# Patient Record
Sex: FEMALE | Race: BLACK OR AFRICAN AMERICAN | NOT HISPANIC OR LATINO | Employment: FULL TIME | URBAN - METROPOLITAN AREA
[De-identification: names, ages, dates, MRNs, and addresses within clinical notes are randomized per-mention and may not be internally consistent; named-entity substitution may affect disease eponyms.]

---

## 2017-01-27 DIAGNOSIS — E03.9 ACQUIRED HYPOTHYROIDISM: ICD-10-CM

## 2017-01-27 LAB — TSH SERPL DL<=0.005 MIU/L-ACNC: 2.99 MU/L (ref 0.4–4)

## 2017-01-27 PROCEDURE — 36415 COLL VENOUS BLD VENIPUNCTURE: CPT | Performed by: OBSTETRICS & GYNECOLOGY

## 2017-01-27 PROCEDURE — 84443 ASSAY THYROID STIM HORMONE: CPT | Performed by: OBSTETRICS & GYNECOLOGY

## 2017-02-09 ENCOUNTER — OFFICE VISIT (OUTPATIENT)
Dept: OBGYN | Facility: CLINIC | Age: 37
End: 2017-02-09
Attending: OBSTETRICS & GYNECOLOGY
Payer: COMMERCIAL

## 2017-02-09 VITALS
WEIGHT: 191 LBS | SYSTOLIC BLOOD PRESSURE: 105 MMHG | DIASTOLIC BLOOD PRESSURE: 74 MMHG | HEART RATE: 84 BPM | BODY MASS INDEX: 34.93 KG/M2

## 2017-02-09 DIAGNOSIS — E03.9 HYPOTHYROIDISM, UNSPECIFIED TYPE: Primary | ICD-10-CM

## 2017-02-09 DIAGNOSIS — Z31.69 ENCOUNTER FOR PRECONCEPTION CONSULTATION: ICD-10-CM

## 2017-02-09 PROCEDURE — 99212 OFFICE O/P EST SF 10 MIN: CPT | Mod: ZF

## 2017-02-09 RX ORDER — LEVOTHYROXINE SODIUM 100 UG/1
100 TABLET ORAL DAILY
Qty: 120 TABLET | Refills: 3 | Status: SHIPPED | OUTPATIENT
Start: 2017-02-09 | End: 2017-07-13

## 2017-02-09 ASSESSMENT — PAIN SCALES - GENERAL: PAINLEVEL: NO PAIN (0)

## 2017-02-09 NOTE — Clinical Note
Date:February 10, 2017      Provider requested that no letter be sent. Do not send.       [facility] Health Information

## 2017-02-09 NOTE — MR AVS SNAPSHOT
After Visit Summary   2017    Isabel Perrin    MRN: [id number]           Patient Information     Date Of Birth          1980        Visit Information        Provider Department      2017 11:00 AM Tawana Funk MD [facility]        Today's Diagnoses     Hypothyroidism, unspecified type    -  1     Encounter for preconception consultation            Follow-ups after your visit        Who to contact     Please call your clinic at [phone] to:    Ask questions about your health    Make or cancel appointments    Discuss your medicines    Learn about your test results    Speak to your doctor   If you have compliments or concerns about an experience at your clinic, or if you wish to file a complaint, please contact [facility] Patient Relations at [phone] or email us at [email or web address]         Additional Information About Your Visit        MyChart Information     [employer] is an electronic gateway that provides easy, online access to your medical records. With [employer], you can request a clinic appointment, read your test results, renew a prescription or communicate with your care team.     To sign up for [employer] visit the website at [email or web address]   You will be asked to enter the access code listed below, as well as some personal information. Please follow the directions to create your username and password.     Your access code is: [email or web address]  Expires: 2017  9:00 AM     Your access code will  in 90 days. If you need help or a new code, please contact your [facility] or call [phone] for assistance.        Care EveryWhere ID     This is your Care EveryWhere ID. This could be used by other organizations to access your [facility] medical records  [id number]        Your Vitals Were     Pulse Last Period Breastfeeding?             84 2017 No          Blood  Pressure from Last 3 Encounters:   02/09/17 105/74   10/13/16 110/78   07/28/15 135/80    Weight from Last 3 Encounters:   02/09/17 86.637 kg (191 lb)   10/13/16 84.55 kg (186 lb 6.4 oz)   07/28/15 90.22 kg (198 lb 14.4 oz)              Today, you had the following     No orders found for display         Today's Medication Changes          These changes are accurate as of: 2/9/17  2:41 PM.  If you have any questions, ask your nurse or doctor.               These medicines have changed or have updated prescriptions.        Dose/Directions    levothyroxine 100 MCG tablet   Commonly known as:  SYNTHROID/LEVOTHROID   This may have changed:    - medication strength  - how much to take   Used for:  Hypothyroidism, unspecified type   Changed by:  Tawana Funk MD        Dose:  100 mcg   Take 1 tablet (100 mcg) by mouth daily   Quantity:  120 tablet   Refills:  3            Where to get your medicines      These medications were sent to [facility] #[id number] - [address] [address]     Phone:  [phone]    - levothyroxine 100 MCG tablet             Primary Care Provider    None Doctor, MD       No address on file        Thank you!     Thank you for choosing [facility]  for your care. Our goal is always to provide you with excellent care. Hearing back from our patients is one way we can continue to improve our services. Please take a few minutes to complete the written survey that you may receive in the mail after your visit with us. Thank you!             Your Updated Medication List - Protect others around you: Learn how to safely use, store and throw away your medicines at www.disposemymeds.org.          This list is accurate as of: 2/9/17  2:41 PM.  Always use your most recent med list.                   Brand Name Dispense Instructions for use    levothyroxine 100 MCG tablet    SYNTHROID/LEVOTHROID    120 tablet    Take 1  tablet (100 mcg) by mouth daily       prenatal multivitamin  with iron 28-0.8 MG Tabs     90 each    Take 1 tablet by mouth daily       VITAMIN D (CHOLECALCIFEROL) PO      Take 5,000 Units by mouth daily

## 2017-02-09 NOTE — Clinical Note
2/9/2017       RE: Isabel Perrin  [address]    [facility] [address]     Dear Colleague,    Thank you for referring your patient, Isabel Perrin, to the [facility] at [facility]. Please see a copy of my visit note below.    35 yo P0 here to review preconception plan.  Patient has a history of myomatous uterus and is s/p Davinci assisted laparoscopic myomectomy in 2012.  She last had imagine of her uterus last summer, there were multiple small (<2cm myoma), none impacted the endometrial cavity per the SIS.  She was started on synthroid for subclinical hypothyroidism with goal TSH <2.5.  She is currently on 50 mcg daily dose and last TSH on 1/27 was 2.99.  She didn't go to [place] to see her  after her last visit in October.  She is planning to go to [facility] for 4 months, leaving soon.  She is otherwise feeling well.  She is taking a prenatal vitamin.  She is have regular cycles q 28 days. LMP 1/16/17     ROS: 10 point ROS neg other than the symptoms noted above in the HPI.    Past Medical History   Diagnosis Date     Hypothyroid      Past Surgical History   Procedure Laterality Date     Excise breast cyst/fibroadenoma/tumor/duct lesion/nipple lesion/areolar lesion       right fibroadenoma     Shoulder surgery       left shoulder     Davinci myomectomy  3/8/2012     Procedure:DAVINCI MYOMECTOMY; Davinci Myomectomy Converted to Open Mini-Laparotomy; Surgeon:RASHMI SERNA; Location:[facility]     Physical exam:  /74 mmHg  Pulse 84  Wt 86.637 kg (191 lb)  LMP 01/16/2017  Breastfeeding? No  Gen'l: appears well    TSH   Date Value Ref Range Status   01/27/2017 2.99 0.40 - 4.00 mU/L Final   10/13/2016 3.67 0.40 - 4.00 mU/L Final   07/01/2015 2.82 0.40 - 4.00 mU/L Final     Assessment/Plan  Subclinical hypthyroidism  Desires fertiliy  History of myomectomy    - continue PNV  - discussed ovulatory time and timing coitus, recommend Clue  navin  - increase synthroid to 100mcg daily, repeat TSH in 6-8 week (will likely be in [facility], plan on return).  Reviewed s/sxs of hyperthyroidism  - RTC with + UPT or with concerns.    Tawana Funk MD    Total visit time was 15 minutes with 15 minutes spent in counseling and coordination of care for sybclinical hypothyroidism and preconception counseling.    Tawana Funk MD        Again, thank you for allowing me to participate in the care of your patient.      Sincerely,    Tawana Funk MD

## 2017-02-09 NOTE — PROGRESS NOTES
35 yo P0 here to review preconception plan.  Patient has a history of myomatous uterus and is s/p Davinci assisted laparoscopic myomectomy in 2012.  She last had imagine of her uterus last summer, there were multiple small (<2cm myoma), none impacted the endometrial cavity per the SIS.  She was started on synthroid for subclinical hypothyroidism with goal TSH <2.5.  She is currently on 50 mcg daily dose and last TSH on 1/27 was 2.99.  She didn't go to [place] to see her  after her last visit in October.  She is planning to go to [facility] for 4 months, leaving soon.  She is otherwise feeling well.  She is taking a prenatal vitamin.  She is have regular cycles q 28 days. LMP 1/16/17     ROS: 10 point ROS neg other than the symptoms noted above in the HPI.    Past Medical History   Diagnosis Date     Hypothyroid      Past Surgical History   Procedure Laterality Date     Excise breast cyst/fibroadenoma/tumor/duct lesion/nipple lesion/areolar lesion       right fibroadenoma     Shoulder surgery       left shoulder     Davinci myomectomy  3/8/2012     Procedure:DAVINCI MYOMECTOMY; Davinci Myomectomy Converted to Open Mini-Laparotomy; Surgeon:RASHMI SERNA; Location:[facility]     Physical exam:  /74 mmHg  Pulse 84  Wt 86.637 kg (191 lb)  LMP 01/16/2017  Breastfeeding? No  Gen'l: appears well    TSH   Date Value Ref Range Status   01/27/2017 2.99 0.40 - 4.00 mU/L Final   10/13/2016 3.67 0.40 - 4.00 mU/L Final   07/01/2015 2.82 0.40 - 4.00 mU/L Final     Assessment/Plan  Subclinical hypthyroidism  Desires fertiliy  History of myomectomy    - continue PNV  - discussed ovulatory time and timing coitus, recommend Clue navin  - increase synthroid to 100mcg daily, repeat TSH in 6-8 week (will likely be in [place], plan on return).  Reviewed s/sxs of hyperthyroidism  - RTC with + UPT or with concerns.    Rashmi Serna MD    Total visit time was 15 minutes with 15 minutes spent in counseling and coordination of care  for sybclinical hypothyroidism and preconception counseling.    Tawana Funk MD

## 2017-05-16 ENCOUNTER — APPOINTMENT (OUTPATIENT)
Dept: LAB | Facility: CLINIC | Age: 37
End: 2017-05-16
Attending: OBSTETRICS & GYNECOLOGY
Payer: COMMERCIAL

## 2017-05-17 ENCOUNTER — OFFICE VISIT (OUTPATIENT)
Dept: OBGYN | Facility: CLINIC | Age: 37
End: 2017-05-17
Attending: OBSTETRICS & GYNECOLOGY
Payer: COMMERCIAL

## 2017-05-17 VITALS
HEIGHT: 62 IN | SYSTOLIC BLOOD PRESSURE: 109 MMHG | BODY MASS INDEX: 35.68 KG/M2 | DIASTOLIC BLOOD PRESSURE: 77 MMHG | HEART RATE: 87 BPM | WEIGHT: 193.9 LBS

## 2017-05-17 DIAGNOSIS — E05.90 HYPERTHYROIDISM: Primary | ICD-10-CM

## 2017-05-17 PROCEDURE — 99212 OFFICE O/P EST SF 10 MIN: CPT | Mod: ZF

## 2017-05-17 RX ORDER — BENZOCAINE/MENTHOL 6 MG-10 MG
LOZENGE MUCOUS MEMBRANE
Qty: 30 G | Refills: 0 | Status: SHIPPED | OUTPATIENT
Start: 2017-05-17 | End: 2021-08-03

## 2017-05-17 NOTE — LETTER
"2017    RE: Isabel Perrin  [address]   [place] [address]     Dear Colleague,    Thank you for referring your patient, Isabel Perrin, to the [facility] at [facility]. Please see a copy of my visit note below.    Follow up visit    Patient back from [facility], following up for thyroid labs. Also requests all hormone labs be performed as she is trying to get pregnant and wants to know her likelihood of pregnancy. Having difficulty with achieving orgasm since  returned from [facility]. Patient has a history of an infib, but has been able to achieve orgasm in the past. Since he returned, she has not been able to. She denies problems/issues in the relationship.     O:  /77 (BP Location: Left arm, Patient Position: Chair)  Pulse 87  Ht 1.575 m (5' 2\")  Wt 88 kg (193 lb 14.4 oz)  LMP 2017  Breastfeeding? No  BMI 35.46 kg/m2  Gen: alert, NAD  Resp: Nonlabored    A/P:  Isabel Perrin is a 36 year old  with hyperthyroidism presenting for follow up thyroid labs and to have fertility testing performed as she wants to become pregnant. Day 3 labs ordered, TSH. Will need to call patient with results and adjust meds as necessary. After labs, patient should make a follow up appointment to discuss lack of orgasm further and fertility labs. Instructions given on ovulation predictor kits.    Discussed with Dr. Dyllan Dukes MD  Obstetrics and Gynecology PGY-4   .  The Patient was seen in [facility] by SALEEM DUKES.  I reviewed the history & exam. Assessment and plan were jointly made.    Gwendolyn Mijares MD    "

## 2017-05-17 NOTE — NURSING NOTE
Chief Complaint   Patient presents with     Follow Up For     Follow up thyroid issues and labs       See SAVITA Goel 5/17/2017

## 2017-05-17 NOTE — MR AVS SNAPSHOT
After Visit Summary   5/17/2017    Isabel Perrin    MRN: [id number]           Patient Information     Date Of Birth          1980        Visit Information        Provider Department      5/17/2017 3:45 PM Natalee Dukes MD [facility]        Today's Diagnoses     Hyperthyroidism    -  1      Care Instructions    Cycle instructions:    The first day of bleeding/period is called Day 1.    Start testing for ovulation using the store bought ovulation predictor kits on Day 11 of cycle.  When you get a positive surge, you will most likely be ovulating within the next 24 hours.       Test the urine about the same time each day.  (should try to test between 2-4 pm, empty bladder about noon)         The test is positive when you see 2 dark solid lines, or a smiley face.  (one faint line and one dark line is NOT positive)         Once the test is positive, you can stop testing.  Have sex/intercourse the day the test is positive.  The next day, have sex again.      Basic info:  The ovulation predictor kits are found in the condom/tampon section of the store.  Clear blue easy brand is simple to read.  Most women will get a positive LH surge before day 20 of the cycle.  There is a chance of twins when taking clomid as well as making too many cysts on the ovaries.  Do not use lubricants with intercourse when trying to get pregnant. (Pre-Seed is okay)            Follow-ups after your visit        Your next 10 appointments already scheduled     Pablo 15, 2017 10:15 AM CDT   Return Visit with Tawana Funk MD   [facility] ([facility] [[email or web address] Clinics)    [facility] [address]  [address]  [facility] [address] [phone]              Who to contact     Please call your clinic at [phone] to:    Ask questions about your health    Make or cancel appointments    Discuss your medicines    Learn about your test results    Speak to  "your doctor   If you have compliments or concerns about an experience at your clinic, or if you wish to file a complaint, please contact [facility] Patient Relations at [phone] or email us at [email or web address]         Additional Information About Your Visit        [employer] Information     [employer] is an electronic gateway that provides easy, online access to your medical records. With [employer], you can request a clinic appointment, read your test results, renew a prescription or communicate with your care team.     To sign up for [employer] visit the website at www."[email or web address]   You will be asked to enter the access code listed below, as well as some personal information. Please follow the directions to create your username and password.     Your access code is: [email or web address]  Expires: 2017  6:30 AM     Your access code will  in 90 days. If you need help or a new code, please contact your [facility] or call [phone] for assistance.        Care EveryWhere ID     This is your Care EveryWhere ID. This could be used by other organizations to access your [facility] medical records  [id number]        Your Vitals Were     Pulse Height Last Period Breastfeeding? BMI (Body Mass Index)       87 1.575 m (5' 2\") 2017 No 35.46 kg/m2        Blood Pressure from Last 3 Encounters:   17 109/77   17 105/74   10/13/16 110/78    Weight from Last 3 Encounters:   17 88 kg (193 lb 14.4 oz)   17 86.6 kg (191 lb)   10/13/16 84.6 kg (186 lb 6.4 oz)              We Performed the Following     25- OH-Vitamin D     Estradiol     Follicle Stimulating Hormone     TSH with free T4 reflex          Today's Medication Changes          These changes are accurate as of: 17  4:16 PM.  If you have any questions, ask your nurse or doctor.               Start taking these medicines.        Dose/Directions    hydrocortisone 1 % " cream   Commonly known as:  CORTAID   Used for:  Hyperthyroidism   Started by:  Natalee Dukes MD        Apply sparingly to affected area two times daily for 14 days.   Quantity:  30 g   Refills:  0            Where to get your medicines      [id number] - [address] [address]     Phone:  [phone]     hydrocortisone 1 % cream                Primary Care Provider    None Doctor, MD       No address on file        Thank you!     Thank you for choosing [facility]  for your care. Our goal is always to provide you with excellent care. Hearing back from our patients is one way we can continue to improve our services. Please take a few minutes to complete the written survey that you may receive in the mail after your visit with us. Thank you!             Your Updated Medication List - Protect others around you: Learn how to safely use, store and throw away your medicines at www.disposemymeds.org.          This list is accurate as of: 5/17/17  4:16 PM.  Always use your most recent med list.                   Brand Name Dispense Instructions for use    hydrocortisone 1 % cream    CORTAID    30 g    Apply sparingly to affected area two times daily for 14 days.       levothyroxine 100 MCG tablet    SYNTHROID/LEVOTHROID    120 tablet    Take 1 tablet (100 mcg) by mouth daily       prenatal multivitamin  with iron 28-0.8 MG Tabs     90 each    Take 1 tablet by mouth daily       VITAMIN D (CHOLECALCIFEROL) PO      Take 5,000 Units by mouth daily

## 2017-05-17 NOTE — PATIENT INSTRUCTIONS
Cycle instructions:    The first day of bleeding/period is called Day 1.    Start testing for ovulation using the store bought ovulation predictor kits on Day 11 of cycle.  When you get a positive surge, you will most likely be ovulating within the next 24 hours.       Test the urine about the same time each day.  (should try to test between 2-4 pm, empty bladder about noon)         The test is positive when you see 2 dark solid lines, or a smiley face.  (one faint line and one dark line is NOT positive)         Once the test is positive, you can stop testing.  Have sex/intercourse the day the test is positive.  The next day, have sex again.      Basic info:  The ovulation predictor kits are found in the condom/tampon section of the store.  Clear blue easy brand is simple to read.  Most women will get a positive LH surge before day 20 of the cycle.  There is a chance of twins when taking clomid as well as making too many cysts on the ovaries.  Do not use lubricants with intercourse when trying to get pregnant. (Pre-Seed is okay)

## 2017-06-15 ENCOUNTER — TELEPHONE (OUTPATIENT)
Dept: OBGYN | Facility: CLINIC | Age: 37
End: 2017-06-15

## 2017-06-15 NOTE — TELEPHONE ENCOUNTER
Received call from Isabel. She missed her appointment today with Dr. Funk as is concerned because she started bleeding yesterday and went to the [facility] ED and found out she was pregnant. Her LMP was 5/9/17. She is having bright red bleeding with some abdominal pain/cramping. She reports they haresh an HCG level and she will return on Saturday for a second HCG. She is going to have them fax her results to our clinic.     Advised that she call us on Monday with results of her test. Discussed why this test is done and what we are looking for (MAB) when ordering HCG. She is going to schedule a follow up with Dr. Funk in case this is another miscarriage otherwise if still pregnant we can schedule her for an OB intake. She understood plan and had no further questions.

## 2017-07-12 DIAGNOSIS — E05.90 HYPERTHYROIDISM: ICD-10-CM

## 2017-07-12 LAB
ESTRADIOL SERPL-MCNC: 62 PG/ML
FSH SERPL-ACNC: 9.5 IU/L
TSH SERPL DL<=0.005 MIU/L-ACNC: 0.77 MU/L (ref 0.4–4)

## 2017-07-12 PROCEDURE — 83001 ASSAY OF GONADOTROPIN (FSH): CPT | Performed by: OBSTETRICS & GYNECOLOGY

## 2017-07-12 PROCEDURE — 36415 COLL VENOUS BLD VENIPUNCTURE: CPT | Performed by: OBSTETRICS & GYNECOLOGY

## 2017-07-12 PROCEDURE — 82306 VITAMIN D 25 HYDROXY: CPT | Performed by: OBSTETRICS & GYNECOLOGY

## 2017-07-12 PROCEDURE — 84443 ASSAY THYROID STIM HORMONE: CPT | Performed by: OBSTETRICS & GYNECOLOGY

## 2017-07-12 PROCEDURE — 82670 ASSAY OF TOTAL ESTRADIOL: CPT | Performed by: OBSTETRICS & GYNECOLOGY

## 2017-07-13 ENCOUNTER — OFFICE VISIT (OUTPATIENT)
Dept: OBGYN | Facility: CLINIC | Age: 37
End: 2017-07-13
Attending: OBSTETRICS & GYNECOLOGY
Payer: COMMERCIAL

## 2017-07-13 VITALS
SYSTOLIC BLOOD PRESSURE: 106 MMHG | HEART RATE: 69 BPM | BODY MASS INDEX: 34.84 KG/M2 | DIASTOLIC BLOOD PRESSURE: 74 MMHG | WEIGHT: 190.5 LBS

## 2017-07-13 DIAGNOSIS — N96 RECURRENT PREGNANCY LOSS WITHOUT CURRENT PREGNANCY: Primary | ICD-10-CM

## 2017-07-13 DIAGNOSIS — E03.9 HYPOTHYROIDISM, UNSPECIFIED TYPE: ICD-10-CM

## 2017-07-13 DIAGNOSIS — Z31.69 ENCOUNTER FOR PRECONCEPTION CONSULTATION: ICD-10-CM

## 2017-07-13 DIAGNOSIS — D25.9 UTERINE LEIOMYOMA, UNSPECIFIED LOCATION: ICD-10-CM

## 2017-07-13 LAB
DEPRECATED CALCIDIOL+CALCIFEROL SERPL-MC: 36 UG/L (ref 20–75)
HBA1C MFR BLD: 5.1 % (ref 4.3–6)
HCG UR QL: NEGATIVE
INTERNAL QC OK POCT: YES
PROLACTIN SERPL-MCNC: 9 UG/L (ref 3–27)

## 2017-07-13 PROCEDURE — 88289 CHROMOSOME STUDY ADDITIONAL: CPT | Performed by: OBSTETRICS & GYNECOLOGY

## 2017-07-13 PROCEDURE — 83036 HEMOGLOBIN GLYCOSYLATED A1C: CPT | Performed by: OBSTETRICS & GYNECOLOGY

## 2017-07-13 PROCEDURE — 36415 COLL VENOUS BLD VENIPUNCTURE: CPT | Performed by: OBSTETRICS & GYNECOLOGY

## 2017-07-13 PROCEDURE — 83520 IMMUNOASSAY QUANT NOS NONAB: CPT | Performed by: OBSTETRICS & GYNECOLOGY

## 2017-07-13 PROCEDURE — 84146 ASSAY OF PROLACTIN: CPT | Performed by: OBSTETRICS & GYNECOLOGY

## 2017-07-13 PROCEDURE — 88264 CHROMOSOME ANALYSIS 20-25: CPT | Performed by: OBSTETRICS & GYNECOLOGY

## 2017-07-13 PROCEDURE — 81025 URINE PREGNANCY TEST: CPT | Mod: ZF | Performed by: OBSTETRICS & GYNECOLOGY

## 2017-07-13 PROCEDURE — 88230 TISSUE CULTURE LYMPHOCYTE: CPT | Performed by: OBSTETRICS & GYNECOLOGY

## 2017-07-13 RX ORDER — SWAB
1 SWAB, NON-MEDICATED MISCELLANEOUS DAILY
Qty: 90 EACH | Refills: 3 | Status: SHIPPED | OUTPATIENT
Start: 2017-07-13 | End: 2017-11-07

## 2017-07-13 RX ORDER — LEVOTHYROXINE SODIUM 100 UG/1
100 TABLET ORAL DAILY
Qty: 120 TABLET | Refills: 3 | Status: SHIPPED | OUTPATIENT
Start: 2017-07-13 | End: 2021-08-03

## 2017-07-13 NOTE — Clinical Note
JANE RN team: please call patient to let her know that chromosome test is back and was normal.  I sent her a letter, but I don't think she has received them in past. Given all normal results and her age important that she have consult with infertility doctor.  Thank you, Tawana

## 2017-07-13 NOTE — LETTER
7/27/2017         Devi Perrin   [address] [place] [address]        Dear Ms. Perrin:    The results of your recent chromosome anlysis were normal.     Results for orders placed or performed in visit on 07/13/17   CHROMOSOME BLOOD HIGH RESOLUTION With Professional Interpretation   Result Value Ref Range    Copath Report       Patient Name: DEVI PERRIN  MR#: [id number]  Specimen #: [id number]  Collected: 7/13/2017 11:30  Received: 7/13/2017 15:21  Reported: 7/26/2017 18:06  Ordering Phy(s): RASHMI SERNA    For improved result formatting, select 'View Enhanced Report Format'  under Linked Documents section.  __________________________________________    TEST(S) REQUESTED:  Blood High Resolution Analysis    SPECIMEN DESCRIPTION:  Peripheral Blood    CLINICAL COMMENTS:  Recurrent pregnancy loss w/o current pregnancy    Metaphases analyzed:                  20  Additional metaphases screened:          0  Metaphases karyotyped:               2  Banding utilized:               G-banding  Band resolution:                    600-800    METHODS:  PHA stimulated, MTX synchronized cultures.    ISCN:  46,XX    INTERPRETATION:  These findings represent a normal female karyotype. No numerical or  structural chromosomal abnormality was found.    Awa Lee, Ph.D., [facility]  Director, Cytogenetics Laboratory    Saad reza Signed Out By:  Velma Obrien M.D., [facility]    CPT Codes:  A: 16462-JDSTW, 89952-ZDQCHJ, 75296-ZEAHIZ, 48222-GUMRD    TESTING LAB LOCATION:  [facility]   PWB, [address] [address] [phone]    COLLECTION SITE:  Client:  [facility]  Location:  [facility] (B)           Please note that test explanations are brief and do not reflect all diagnostic uses.  If you have any questions or concerns, please call the clinic at [phone].      Sincerely,      Rashmi Serna MD

## 2017-07-13 NOTE — LETTER
7/18/2017         Isabel Marychuy   [address] [place] [address]        Dear Ms. Perrin:    The results of your recent test(s) listed below were normal.  The chromosome test is still pending and I will let you know when that is back.  Take care.    Results for orders placed or performed in visit on 07/13/17   Prolactin   Result Value Ref Range    Prolactin 9 3 - 27 ug/L   Hemoglobin A1c   Result Value Ref Range    Hemoglobin A1C 5.1 4.3 - 6.0 %   Anti-Mullerian hormone   Result Value Ref Range    Anti-Mullerian Hormone 0.327    hCG qual urine POCT   Result Value Ref Range    HCG Qual Urine Negative neg    Internal QC OK Yes          Please note that test explanations are brief and do not reflect all diagnostic uses.  If you have any questions or concerns, please call the clinic at [phone].      Sincerely,      Tawana Funk MD

## 2017-07-13 NOTE — LETTER
7/13/2017       RE: Isabel Perrin  [address]  [place] [address]     Dear Colleague,    Thank you for referring your patient, Isabel Perrin, to the [facility] at [facility]. Please see a copy of my visit note below.    35 yo  with history of recent SAB.  Patient has had a recent miscarriage in the first trimester.  She was followed with serial hcg levels after bleeding.  These labs were done at [facility] and her last HCG in June was 38.  She is now cycle day #4 of her next menstrual cycle.    Her  has moved from [facility] to [place] and they very much want to become pregnant.  She had day 3 labs done yesterday and she continues to take levothyroxine for management of subclinical hypothyroidism.    Patient has had a davinci myomectomy and HSG done at [facility] in 2016 was normal.  She denies a significant change in her menstrual cycle which continues to be regular.    Past Medical History:   Diagnosis Date     Hypothyroid      Past Surgical History:   Procedure Laterality Date     DAVINCI MYOMECTOMY  3/8/2012    Procedure:DAVINCI MYOMECTOMY; Davinci Myomectomy Converted to Open Mini-Laparotomy; Surgeon:RASHMI SERNA; Location:[facility]     EXCISE BREAST CYST/FIBROADENOMA/TUMOR/DUCT LESION/NIPPLE LESION/AREOLAR LESION      right fibroadenoma     SHOULDER SURGERY      left shoulder     Family History   Problem Relation Age of Onset     Hypertension Mother      Breast Cancer No family hx of      Cancer - colorectal No family hx of      DIABETES No family hx of      C.A.D. No family hx of      Gynecology No family hx of      no history of fibroids     Physical exam:  /74  Pulse 69  Wt 86.4 kg (190 lb 8 oz)  BMI 34.84 kg/m2  Gen'l: appears well, no distress  UPT negative    Assessment/Plan  Recurrent pregnancy loss  Advanced maternal age, likely diminished ovarian reserve    - discussed investigation of recurrent pregnancy  loss: recommend hgbA1c, prolactin and cytogenetics.  Her  will have to establish care in our system to have cytogenetics ordered for him as well.  After 3 losses will do  APLAS testing  - Discussed results of recent labs.  TSH is in desired range, continue synthroid.  FSH is elevated, but still less than 10 which reflects likely impact of age on ovarian reserve.  Will check AMH as well  - Discussed empiric vaginal progesterone through 10 weeks and patient would like to plan on this.  She will call with positive UPT.  - Encouraged patient to see FRANCISCO.  She will call for consult.    Total visit time was 20 minutes with 20 minutes spent in counseling and coordination of care for recurrent pregnancy loss.    Again, thank you for allowing me to participate in the care of your patient.      Sincerely,    Tawana Funk MD

## 2017-07-13 NOTE — MR AVS SNAPSHOT
After Visit Summary   2017    Isabel Perrin    MRN: [id number]           Patient Information     Date Of Birth          1980        Visit Information        Provider Department      2017 10:15 AM Tawana Funk MD [facility]        Today's Diagnoses     Recurrent pregnancy loss without current pregnancy    -  1    Hypothyroidism, unspecified type        Encounter for preconception consultation        Uterine leiomyoma, unspecified location           Follow-ups after your visit        Follow-up notes from your care team     Return for Follow-up ultrasound and visit.      Future tests that were ordered for you today     Open Future Orders        Priority Expected Expires Ordered    US GYN Complete Transvaginal - 40027 (In Clinic) Routine  11/10/2017 2017            Who to contact     Please call your clinic at [phone] to:    Ask questions about your health    Make or cancel appointments    Discuss your medicines    Learn about your test results    Speak to your doctor   If you have compliments or concerns about an experience at your clinic, or if you wish to file a complaint, please contact [facility] Patient Relations at [phone] or email us at [email or web address]         Additional Information About Your Visit        MyChart Information     NanoHorizonshart is an electronic gateway that provides easy, online access to your medical records. With [employer], you can request a clinic appointment, read your test results, renew a prescription or communicate with your care team.     To sign up for [employer] visit the website at [email or web address]   You will be asked to enter the access code listed below, as well as some personal information. Please follow the directions to create your username and password.     Your access code is: [email or web address]  Expires: 2017  6:30 AM     Your access code will  in 90 days. If  you need help or a new code, please contact your [facility] or call [phone] for assistance.        Care EveryWhere ID     This is your Care EveryWhere ID. This could be used by other organizations to access your [place] medical records  [id number]        Your Vitals Were     Pulse BMI (Body Mass Index)                69 34.84 kg/m2           Blood Pressure from Last 3 Encounters:   07/13/17 106/74   05/17/17 109/77   02/09/17 105/74    Weight from Last 3 Encounters:   07/13/17 86.4 kg (190 lb 8 oz)   05/17/17 88 kg (193 lb 14.4 oz)   02/09/17 86.6 kg (191 lb)              We Performed the Following     Anti-Mullerian hormone     CHROMOSOME BLOOD HIGH RESOLUTION With Professional Interpretation     hCG qual urine POCT     Hemoglobin A1c     Prolactin          Where to get your medicines      These medications were [facility] to [id number] - [address], [facility] [address]     Phone:  [phone]     levothyroxine 100 MCG tablet    prenatal multivitamin  with iron 28-0.8 MG Tabs          Primary Care Provider    None Doctor, MD       No address on file        Equal Access to Services     LITA [facility] Hospital[facility]ANIELLE : Hadii mirta [place], waaxda lujarred, qaybta kaalmada adeclint, becky villanueva . So [facility] [phone].    ATENCIÓN: Si habla español, tiene a chappell disposición servicios gratuitos de asistencia lingüística. Llame al [phone].    We comply with applicable federal civil rights laws and [place] laws. We do not discriminate on the basis of race, color, national origin, age, disability sex, sexual orientation or gender identity.            Thank you!     Thank you for choosing [facility]  for your care. Our goal is always to provide you with excellent care. Hearing back from our patients is one way we can continue to improve our services. Please take a few  minutes to complete the written survey that you may receive in the mail after your visit with us. Thank you!             Your Updated Medication List - Protect others around you: Learn how to safely use, store and throw away your medicines at www.disposemymeds.org.          This list is accurate as of: 7/13/17 12:02 PM.  Always use your most recent med list.                   Brand Name Dispense Instructions for use Diagnosis    hydrocortisone 1 % cream    CORTAID    30 g    Apply sparingly to affected area two times daily for 14 days.    Hyperthyroidism       levothyroxine 100 MCG tablet    SYNTHROID/LEVOTHROID    120 tablet    Take 1 tablet (100 mcg) by mouth daily    Hypothyroidism, unspecified type       prenatal multivitamin  with iron 28-0.8 MG Tabs     90 each    Take 1 tablet by mouth daily    Encounter for preconception consultation       VITAMIN D (CHOLECALCIFEROL) PO      Take 5,000 Units by mouth daily

## 2017-07-13 NOTE — PROGRESS NOTES
37 yo  with history of recent SAB.  Patient has had a recent miscarriage in the first trimester.  She was followed with serial hcg levels after bleeding.  These labs were done at [facility] and her last HCG in June was 38.  She is now cycle day #4 of her next menstrual cycle.    Her  has moved from [facility] to [place] and they very much want to become pregnant.  She had day 3 labs done yesterday and she continues to take levothyroxine for management of subclinical hypothyroidism.    Patient has had a davinci myomectomy and HSG done at [facility] in 2016 was normal.  She denies a significant change in her menstrual cycle which continues to be regular.    Past Medical History:   Diagnosis Date     Hypothyroid      Past Surgical History:   Procedure Laterality Date     DAVINCI MYOMECTOMY  3/8/2012    Procedure:DAVINCI MYOMECTOMY; Davinci Myomectomy Converted to Open Mini-Laparotomy; Surgeon:RASHMI SERNA; Location[facility]     EXCISE BREAST CYST/FIBROADENOMA/TUMOR/DUCT LESION/NIPPLE LESION/AREOLAR LESION      right fibroadenoma     SHOULDER SURGERY      left shoulder     Family History   Problem Relation Age of Onset     Hypertension Mother      Breast Cancer No family hx of      Cancer - colorectal No family hx of      DIABETES No family hx of      C.A.D. No family hx of      Gynecology No family hx of      no history of fibroids     Physical exam:  /74  Pulse 69  Wt 86.4 kg (190 lb 8 oz)  BMI 34.84 kg/m2  Gen'l: appears well, no distress  UPT negative    Assessment/Plan  Recurrent pregnancy loss  Advanced maternal age, likely diminished ovarian reserve    - discussed investigation of recurrent pregnancy loss: recommend hgbA1c, prolactin and cytogenetics.  Her  will have to establish care in our system to have cytogenetics ordered for him as well.  After 3 losses will do  APLAS testing  - Discussed results of recent labs.  TSH is in desired range, continue synthroid.  FSH is  elevated, but still less than 10 which reflects likely impact of age on ovarian reserve.  Will check AMH as well  - Discussed empiric vaginal progesterone through 10 weeks and patient would like to plan on this.  She will call with positive UPT.  - Encouraged patient to see FRANCISCO.  She will call for consult.    Total visit time was 20 minutes with 20 minutes spent in counseling and coordination of care for recurrent pregnancy loss.    Tawana Funk MD

## 2017-07-16 LAB — MIS SERPL-MCNC: 0.33 NG/ML

## 2017-07-25 ENCOUNTER — TELEPHONE (OUTPATIENT)
Dept: OBGYN | Facility: CLINIC | Age: 37
End: 2017-07-25

## 2017-07-25 NOTE — TELEPHONE ENCOUNTER
Spoke to Isabel who states she never got Dr Funk's letter sent 17.  Dear Ms. Perrin:     The results of your recent test(s) listed below were normal.  The chromosome test is still pending and I will let you know when that is back.  Take care.           Results for orders placed or performed in visit on 17   Prolactin   Result Value Ref Range     Prolactin 9 3 - 27 ug/L   Hemoglobin A1c   Result Value Ref Range     Hemoglobin A1C 5.1 4.3 - 6.0 %   Anti-Mullerian hormone   Result Value Ref Range     Anti-Mullerian Hormone 0.327     hCG qual urine POCT   Result Value Ref Range     HCG Qual Urine Negative neg     Internal QC OK Yes            Please note that test explanations are brief and do not reflect all diagnostic uses.  If you have any questions or concerns, please call the clinic at [phone].       Sincerely,        Tawana Funk MD  The [facility] represents a collaboration between [facility] and [facility].         RE: Isabel Perrin    MRN: [id number]   : 1980   ENC DATE: 2017   PAGE: 1      Revision History    Revised by Lizbeth Mcadams on 2017 at 7:41 AM (current version)   Created by Tawana Funk MD on 2017 at 3:11 PM                All her lab work done was WNL. Next step is for Isabel to f/u with Infertility Clinic.Pt indicated understanding and agreed with plan.

## 2017-07-25 NOTE — TELEPHONE ENCOUNTER
----- Message from Fern Fabian sent at 7/24/2017  1:14 PM CDT -----  Regarding: Go over test results  Contact: [phone]  Patient has not received her lab results from labs drawn 7/12 or 7/13/17 & she would like someone to call her to go over these. Please contact patient at [phone].      Thank you!  Taylor    Call Center       Please DO NOT send this message and/or reply back to sender. Call Center Representatives DO NOT respond to messages.

## 2017-07-26 LAB — COPATH REPORT: NORMAL

## 2017-07-27 ENCOUNTER — TELEPHONE (OUTPATIENT)
Dept: OBGYN | Facility: CLINIC | Age: 37
End: 2017-07-27

## 2017-07-27 NOTE — TELEPHONE ENCOUNTER
"Received following message from Dr. Funk \"[facility] RN team: please call patient to let her know that chromosome test is back and was normal.  I sent her a letter, but I don't think she has received them in past. Given all normal results and her age important that she have consult with infertility doctor.  Thank you, Tawana\"    Left message for Isabel with this information on identified voicemail. Asked her to call back if further questions.   "

## 2017-11-07 DIAGNOSIS — Z31.69 ENCOUNTER FOR PRECONCEPTION CONSULTATION: ICD-10-CM

## 2017-11-07 RX ORDER — SWAB
1 SWAB, NON-MEDICATED MISCELLANEOUS DAILY
Qty: 100 EACH | Refills: 3 | Status: SHIPPED | OUTPATIENT
Start: 2017-11-07

## 2018-02-26 ENCOUNTER — RECORDS - HEALTHEAST (OUTPATIENT)
Dept: ADMINISTRATIVE | Facility: OTHER | Age: 38
End: 2018-02-26

## 2018-03-08 ENCOUNTER — RECORDS - HEALTHEAST (OUTPATIENT)
Dept: ADMINISTRATIVE | Facility: OTHER | Age: 38
End: 2018-03-08

## 2018-03-08 ENCOUNTER — COMMUNICATION - HEALTHEAST (OUTPATIENT)
Dept: TELEHEALTH | Facility: CLINIC | Age: 38
End: 2018-03-08

## 2018-03-08 ENCOUNTER — HOSPITAL ENCOUNTER (OUTPATIENT)
Dept: RADIOLOGY | Facility: CLINIC | Age: 38
Discharge: HOME | End: 2018-03-08
Admitting: RADIOLOGY

## 2018-03-08 DIAGNOSIS — Z31.9 PROCREATIVE MANAGEMENT: ICD-10-CM

## 2018-03-16 ENCOUNTER — RECORDS - HEALTHEAST (OUTPATIENT)
Dept: ADMINISTRATIVE | Facility: OTHER | Age: 38
End: 2018-03-16

## 2018-11-15 ENCOUNTER — SURGERY - HEALTHEAST (OUTPATIENT)
Dept: OBGYN | Facility: HOSPITAL | Age: 38
End: 2018-11-15

## 2018-11-15 ENCOUNTER — ANESTHESIA - HEALTHEAST (OUTPATIENT)
Dept: OBGYN | Facility: HOSPITAL | Age: 38
End: 2018-11-15

## 2018-11-15 ASSESSMENT — MIFFLIN-ST. JEOR
SCORE: 1742.71
SCORE: 1742.71

## 2018-11-19 ENCOUNTER — HOME CARE/HOSPICE - HEALTHEAST (OUTPATIENT)
Dept: HOME HEALTH SERVICES | Facility: HOME HEALTH | Age: 38
End: 2018-11-19

## 2018-11-21 ENCOUNTER — HOME CARE/HOSPICE - HEALTHEAST (OUTPATIENT)
Dept: HOME HEALTH SERVICES | Facility: HOME HEALTH | Age: 38
End: 2018-11-21

## 2020-12-30 ENCOUNTER — TRANSFERRED RECORDS (OUTPATIENT)
Dept: EDUCATION SERVICES | Facility: CLINIC | Age: 40
End: 2020-12-30

## 2021-01-07 ENCOUNTER — TELEPHONE (OUTPATIENT)
Dept: EDUCATION SERVICES | Facility: CLINIC | Age: 41
End: 2021-01-07

## 2021-01-07 ENCOUNTER — COMMUNICATION - HEALTHEAST (OUTPATIENT)
Dept: EDUCATION SERVICES | Facility: CLINIC | Age: 41
End: 2021-01-07

## 2021-01-07 ENCOUNTER — PATIENT OUTREACH (OUTPATIENT)
Dept: EDUCATION SERVICES | Facility: CLINIC | Age: 41
End: 2021-01-07
Payer: COMMERCIAL

## 2021-01-07 DIAGNOSIS — O24.410 DIET CONTROLLED GESTATIONAL DIABETES MELLITUS (GDM) IN THIRD TRIMESTER: Primary | ICD-10-CM

## 2021-01-07 DIAGNOSIS — O24.419 GESTATIONAL DIABETES: ICD-10-CM

## 2021-01-07 PROCEDURE — G0108 DIAB MANAGE TRN  PER INDIV: HCPCS | Mod: 95

## 2021-01-07 RX ORDER — BLOOD SUGAR DIAGNOSTIC
1 STRIP MISCELLANEOUS 4 TIMES DAILY
COMMUNITY
Start: 2021-01-07 | End: 2021-08-03

## 2021-01-07 RX ORDER — URINE ACETONE TEST STRIPS
1 STRIP MISCELLANEOUS DAILY
COMMUNITY
Start: 2021-01-07 | End: 2021-08-03

## 2021-01-07 RX ORDER — INSULIN PUMP SYRINGE, 3 ML
1 EACH MISCELLANEOUS ONCE
COMMUNITY
Start: 2021-01-07 | End: 2021-08-03

## 2021-01-07 RX ORDER — LANCETS 30 GAUGE
1 EACH MISCELLANEOUS 4 TIMES DAILY
COMMUNITY
Start: 2021-01-07 | End: 2021-08-03

## 2021-01-07 NOTE — LETTER
1/7/2021         RE: Isabel Perrin  [address]  [place] [facility] [address]        Dear Colleague,    Thank you for referring your patient, Isabel Perrin, to the [facility]. Please see a copy of my visit note below.    Diabetes Self-Management Education & Support    SUBJECTIVE/OBJECTIVE:  Telephone Visit for education related to gestational diabetes.    Patient verbally consented to the telephone visit service today: yes    Accompanied by: Self  Diabetes management related comments/concerns: I've been eating a lot of sweets with this pregnancy.  Gestational weeks: 30w  Hospital planned [facility] delivery: [facility]  Next OB Visit Date: 01/12/21  Number of previous preganancies: 5(1 daughter, multiple miscarriges)  Had any babies over 9 lbs: No  Previously had Gestational Diabetes: No  Have you ever had thyroid problems or taken thyroid medication?: (!) Yes  Heart disease, mitral valve prolapse or rheumatic fever?: No  Hypertension : No  High Cholesterol: No  High Triglycerides: No  Do you use tobacco products?: No  Do you drink beer, wine or hard liquor?: No    Cultural Influences/Ethnic Background:  Choose not to answer    Estimated Date of Delivery: 3/19/21     1 hour OGTT  No results found for: GLU1    3 hour OGTT    Fasting  No results found for: GLF    1 hour  No results found for: GL1    2 hour  No results found for: GL2    3 hour  No results found for: GL3         Lifestyle and Health Behaviors:  Pre-pregnancy weight (lbs): 250  Exercise:: Currently not exercising  Barrier to exercise: None  Cultural/Mormon diet restrictions?: No  Meal planning/habits: Avoiding sweets, Low carb  Meals include: Breakfast, Lunch, Dinner, Morning Snack, Afternoon Snack, Evening Snack  Breakfast: Beans & milk OR pancakes with syrup (SF + reg), tea + sugar OR (recently) 2 eggs, avocado  Lunch: rice or pasta, meat, banana, Ranch or hot sauce, cake or chocolate, strawberry shake OR (recently)  salad with cucumber, corn, tomatoes, Ranch  Dinner: chicken sandwich OR (recently) 2 chicken with milk  Snacks: white bread with butter & strawberry jam OR Cocoa Puff cereal OR chocolate OR (recently) banana, pear  Beverages: Tea, Milk, Water  Biggest challenges to healthy eating: None  Pre-marli vitamin?: Yes  Supplements?: Yes  List supplements currently taking: vitamin D  Experiencing nausea?: No  Experiencing heartburn?: Yes(with spicy foods)    Healthy Coping:  Emotional response to diabetes: Ready to learn  Stage of change: ACTION (Actively working towards change)    Current Management:  Taking medications for gestational diabetes?: No    ASSESSMENT:  Patient states she has not received handouts for GDM education that were sent yesterday. Will plan to resend. She is engaged throughout the visit and asks good questions. Already trying to adjust diet and cut out sweets as she says she has a major sweet tooth with this pregnancy. She is requesting pictures of foods she can eat - plan to send MyPlate planner as well as Montserratian MyPlate image, snack ideas to help patient understand meal plan.     INTERVENTION:  Will send video regarding checking blood sugars with glucometer, patient plans to ask pharmacist how to use this when she picks up supplies today.     Educational topics covered today:  GDM diagnosis, pathophysiology, Risks and Complications of GDM, Means of controlling GDM, Using a Blood Glucose Monitor, Blood Glucose Goals, Logging and Interpreting Glucose Results, Ketone Testing, When to Call a Diabetes Educator or OB Provider, Healthy Eating During Pregnancy, Counting Carbohydrates, Meal Planning for GDM, and Physical Activity    Educational materials provided today:   Jacob Understanding Gestational Diabetes  GDM Log Book  Sharps Disposal  Care After Delivery  **Will send via email + MyPlate planner (American & Montserratian), 100 calorie snack ideas      Pt verbalized understanding of concepts discussed and  recommendations provided today.     PLAN:  Check glucose 4 times daily, before breakfast and 1 hour after each meal.     Check Ketones daily for one week, if negative, reduce testing to once a week.     Physical activity recommended: as able.    Meal plan: 30-45g carbs at breakfast, 45-60g carbs at lunch, 45-60g carbs at supper, 15-30g carbs at 3 snacks a day.  Follow consistent CHO meal plan, eat CHO and protein/fat at all meals/snacks.    Call/e-mail/ClickandBuyhart message diabetes educator if 3 or more blood sugars are above the goal in 1 week, if ketones are positive, or with questions/concerns.    Xochitl Viramontes RD, LD, [facility]   Time Spent: 49 minutes  Encounter Type: Individual    Any diabetes medication dose changes were made via the CDE Protocol and Collaborative Practice Agreement with the patient's OB/GYN provider. A copy of this encounter was shared with the provider.

## 2021-01-07 NOTE — TELEPHONE ENCOUNTER
One Touch Verio BG meter/supplies and ketone test strips ordered in [facility].    Ciara Salmeron RN, [facility]

## 2021-01-07 NOTE — PROGRESS NOTES
Diabetes Self-Management Education & Support    SUBJECTIVE/OBJECTIVE:  Telephone Visit for education related to gestational diabetes.    Patient verbally consented to the telephone visit service today: yes    Accompanied by: Self  Diabetes management related comments/concerns: I've been eating a lot of sweets with this pregnancy.  Gestational weeks: 30w  Hospital planned for delivery: [facility]  Next OB Visit Date: 01/12/21  Number of previous preganancies: 5(1 daughter, multiple miscarriges)  Had any babies over 9 lbs: No  Previously had Gestational Diabetes: No  Have you ever had thyroid problems or taken thyroid medication?: (!) Yes  Heart disease, mitral valve prolapse or rheumatic fever?: No  Hypertension : No  High Cholesterol: No  High Triglycerides: No  Do you use tobacco products?: No  Do you drink beer, wine or hard liquor?: No    Cultural Influences/Ethnic Background:  Choose not to answer    Estimated Date of Delivery: 3/19/21     1 hour OGTT  No results found for: GLU1    3 hour OGTT    Fasting  No results found for: GLF    1 hour  No results found for: GL1    2 hour  No results found for: GL2    3 hour  No results found for: GL3         Lifestyle and Health Behaviors:  Pre-pregnancy weight (lbs): 250  Exercise:: Currently not exercising  Barrier to exercise: None  Cultural/Mandaen diet restrictions?: No  Meal planning/habits: Avoiding sweets, Low carb  Meals include: Breakfast, Lunch, Dinner, Morning Snack, Afternoon Snack, Evening Snack  Breakfast: Beans & milk OR pancakes with syrup (SF + reg), tea + sugar OR (recently) 2 eggs, avocado  Lunch: rice or pasta, meat, banana, Ranch or hot sauce, cake or chocolate, strawberry shake OR (recently) salad with cucumber, corn, tomatoes, Ranch  Dinner: chicken sandwich OR (recently) 2 chicken with milk  Snacks: white bread with butter & strawberry jam OR Cocoa Puff cereal OR chocolate OR (recently) banana, pear  Beverages: Tea, Milk, Water  Biggest  "challenges to healthy eating: None  Pre-marli vitamin?: Yes  Supplements?: Yes  List supplements currently taking: vitamin D  Experiencing nausea?: No  Experiencing heartburn?: Yes(with spicy foods)    Healthy Coping:  Emotional response to diabetes: Ready to learn  Stage of change: ACTION (Actively working towards change)    Current Management:  Taking medications for gestational diabetes?: No    ASSESSMENT:  Patient states she has not received handouts for GDM education that were sent yesterday. Will plan to resend. She is engaged throughout the visit and asks good questions. Already trying to adjust diet and cut out sweets as she says she has a major sweet tooth with this pregnancy. She is requesting pictures of foods she can eat - plan to send MyPlate planner as well as Burkinan MyPlate image, snack ideas to help patient understand meal plan. Did discuss measuring foods (patient does not have measuring cups) and reading a nutrition label. Some difficulty with numbers identified during call, such as patient not understanding blood sugar goal phrases \"less than\" or \"below.\" Reviewed \"good\" numbers (fasting at 90) vs \"too high\" numbers (fasting at 96 or more).     INTERVENTION:  Will send video regarding checking blood sugars with glucometer, patient plans to ask pharmacist how to use this when she picks up supplies today.     Educational topics covered today:  GDM diagnosis, pathophysiology, Risks and Complications of GDM, Means of controlling GDM, Using a Blood Glucose Monitor, Blood Glucose Goals, Logging and Interpreting Glucose Results, Ketone Testing, When to Call a Diabetes Educator or OB Provider, Healthy Eating During Pregnancy, Counting Carbohydrates, Meal Planning for GDM, and Physical Activity    Educational materials provided today:   Jacob Understanding Gestational Diabetes  GDM Log Book  Sharps Disposal  Care After Delivery  **Will send via email + MyPlate planner (American & Burkinan), 100 calorie " snack ideas      Pt verbalized understanding of concepts discussed and recommendations provided today.     PLAN:  Check glucose 4 times daily, before breakfast and 1 hour after each meal.     Check Ketones daily for one week, if negative, reduce testing to once a week.     Physical activity recommended: as able.    Meal plan: 30-45g carbs at breakfast, 45-60g carbs at lunch, 45-60g carbs at supper, 15-30g carbs at 3 snacks a day.  Follow consistent CHO meal plan, eat CHO and protein/fat at all meals/snacks.    Call/e-mail/Polimetrixhart message diabetes educator if 3 or more blood sugars are above the goal in 1 week, if ketones are positive, or with questions/concerns.    Xochitl Viramontes RD, LD, [facility]   Time Spent: 49 minutes  Encounter Type: Individual    Any diabetes medication dose changes were made via the CDE Protocol and Collaborative Practice Agreement with the patient's OB/GYN provider. A copy of this encounter was shared with the provider.

## 2021-01-07 NOTE — PATIENT INSTRUCTIONS
Your 1 week follow-up Diabetes Education visit is scheduled on 1/14    1. Check blood sugar 4 times a day, before breakfast and 1 hour after the start of each meal.     2. Check urine ketones when you wake up every morning for 7 days. If negative everyday, reduce testing to once a week.    3. Follow the recommended meal plan: eat something every 2-3 hours, include protein/fat and carbohydrate at every meal and snack, have 30-45g carbs at breakfast, 45-60g carbs at lunch, 45-60g carbs at supper, 15-30g carbs at 3 snacks per day.     4. Add activity to every day, try walking or being active after each meal to help control blood sugar levels.    5.Call or send a LogRhythm message to your educator if 3 or more blood sugars are above goal in 1 week, you have an elevated ketone result (trace or higher), or with questions or concerns.      [place] [facility] for the [facility]:  For Your Diabetes Education or Nutrition Appointments Call:  [phone]   For Diabetes Education and Nutrition Related Questions:   Phone: [phone]  Send LogRhythm Message   If you need a medication refill please contact your pharmacy. Please allow 3 business days for your refills to be completed.

## 2021-01-14 ENCOUNTER — PATIENT OUTREACH (OUTPATIENT)
Dept: EDUCATION SERVICES | Facility: CLINIC | Age: 41
End: 2021-01-14
Payer: COMMERCIAL

## 2021-01-14 DIAGNOSIS — O24.410 DIET CONTROLLED GESTATIONAL DIABETES MELLITUS (GDM) IN THIRD TRIMESTER: Primary | ICD-10-CM

## 2021-01-14 PROCEDURE — 98968 PH1 ASSMT&MGMT NQHP 21-30: CPT | Mod: 95

## 2021-01-14 NOTE — PATIENT INSTRUCTIONS
Continue to check blood sugar 4 times a day    Have yogurt with your milk for bedtime snack (can also have 1 piece bread, 1/2 apple, 5-6 crackers instead     Check ketones once a day in the morning with the urine at the first time you use the bathroom for the day after waking up    Silvia will call you Monday, 1/18/21 between 9-10 am to check in.     Call with questions: [phone]

## 2021-01-14 NOTE — PROGRESS NOTES
Diabetes Self-Management Education & Support    Type of Service: Telephone Visit    How would patient like to obtain AVS? Mail a copy    SUBJECTIVE/OBJECTIVE:  Presents for education related to gestational diabetes.    Accompanied by: Self  Diabetes management related comments/concerns: Has changed diet and checking blood sugars 4 times/day. Has not started checking ketones yet, she has questions about this. Finds she's not been as hungry lately.  Gestational weeks: 31w  Hospital planned [facility] delivery: [facility]  Number of previous preganancies: 5(1 daughter, multiple miscarriges)  Had any babies over 9 lbs: No  Previously had Gestational Diabetes: No  Have you ever had thyroid problems or taken thyroid medication?: (!) Yes  Heart disease, mitral valve prolapse or rheumatic fever?: No  Hypertension : No  High Cholesterol: No  High Triglycerides: No  Do you use tobacco products?: No  Do you drink beer, wine or hard liquor?: No    Cultural Influences/Ethnic Background:  [place]vad[place]    Estimated Date of Delivery: 3/19/21    Blood Glucose/Ketone Log:   Fasting glucose   Post-breakfast: all in the 120's  Post-lunch: highest 126  Post-dinner:     Lifestyle and Health Behaviors:  Pre-pregnancy weight (lbs): 250  Exercise:: Currently not exercising  Barrier to exercise: None  Cultural/Taoist diet restrictions?: No  Meal planning/habits: Avoiding sweets, Low carb  Meals include: Breakfast, Lunch, Dinner, Morning Snack, Afternoon Snack, Evening Snack  Breakfast: 2 bread with egg  Lunch: salad with chicken  Dinner: vegetables with meat  Snacks: tea cup of milk OR kale/avocado/cucumber shake OR apple; bedtime snack is tea cup of milk  Beverages: Tea, Milk, Water  How many servings of fruits/vegetables per day: 4  Biggest challenges to healthy eating: None  Pre-marli vitamin?: Yes  Supplements?: Yes  List supplements currently taking: vitamin D  Experiencing nausea?: No  Experiencing heartburn?: Yes(with spicy  foods)    Healthy Coping:  Emotional response to diabetes: Ready to learn  Stage of change: ACTION (Actively working towards change)    Current Management:  Taking medications for gestational diabetes?: No  Difficulty affording diabetes medication?: No  Difficulty affording diabetes testing supplies?: No    ASSESSMENT:  Ketones: not checking yet.   Fasting blood glucoses: 0% in target.  After breakfast: 100% in target.  After lunch: 100% in target.  After dinner: 100% in target.    Fasting glucose are above goal. Isabel is not having adequate carbohydrate at bedtime snack. Advised adding 1 carbohydrate choice to milk at bedtime snack - she is willing to add an Activia yogurt. Follow-up on Monday, 1/18/21 to see if this change to bedtime snack helps to improve fasting glucose. Isabel has not started checking ketones yet, answered her questions about ketone testing.     INTERVENTION:  Educational topics covered today:  What to expect after delivery, Future testing for Type 2 diabetes (2 hour OGTT at 6 week post-partum check-up and annual fasting blood glucose level), Risk of GDM and planning ahead for future pregnancies, Recommended lifestyle interventions for reducing the risk of Type 2 Diabetes, When to Call a Diabetes Educator or OB Provider, Ketone Testing, Meal plan and getting adequate carbohydrate throughout the day and at bedtime snack.     Educational Materials provided today:  No new materials provided today    PLAN:  Check glucose 4 times daily.  Check ketones once a week when readings are consistently negative.  Continue with recommended physical activity.  Continue to follow recommended meal plan: 30-45 g carbs at breakfast, 45-60 g carbs at lunch, 45-60 g carbs at supper, 15-30 carbs at snacks.  Follow consistent CHO meal plan, eat CHO and protein/fat at all meals/snacks.    Call/e-mail/[employer] message diabetes educator if 3 or more blood sugars are above the goal in 1 week or if ketones are  positive.    Silvia Chavarria, MPH, RDN, LD, CDCES   Time Spent: 22 minutes  Encounter Type: Individual    Any diabetes medication dose changes were made via the CDE Protocol and Collaborative Practice Agreement with the patient's OB/GYN provider. A copy of this encounter was shared with the provider.

## 2021-01-15 ENCOUNTER — HEALTH MAINTENANCE LETTER (OUTPATIENT)
Age: 41
End: 2021-01-15

## 2021-01-18 ENCOUNTER — TELEPHONE (OUTPATIENT)
Dept: EDUCATION SERVICES | Facility: CLINIC | Age: 41
End: 2021-01-18
Payer: COMMERCIAL

## 2021-01-18 DIAGNOSIS — O24.410 DIET CONTROLLED GESTATIONAL DIABETES MELLITUS (GDM) IN THIRD TRIMESTER: Primary | ICD-10-CM

## 2021-01-18 NOTE — LETTER
1/18/2021         RE: Isabel Perrin  [address]  [place] [facility] [address]        Dear Colleague,    Thank you for referring your patient, Isabel Perrin, to the [facility]. Please see a copy of my visit note below.    Estimated Date of Delivery: Data Unavailable     Blood Glucose/Ketone Log:     Date Ketones Fasting Post Breakfast Post Lunch Post Supper   1/14   100 120 126 113   1/15 small 98 128 130 122   1/16 trace 98 119 132 121   1/17 moderate 96 122 134 97   1/18 trace 96 125 132 112   1/19 small 99 130 125 111   1/20   98 126        Gestational Diabetes Follow-up     Subjective/Objective:     Isabel Perrin was called for a scheduled BG review. Last date of communication was: 1/14/2021.     Gestational diabetes is being managed with diet and activity     Taking diabetes medications: no     Assessment:     Ketones: small-moderate.   Fasting blood glucoses: 0% in target.  After breakfast: 100% in target.  After lunch: 100% in target.  After dinner: 100% in target.    Plan/Response:  Instructed patient on how to use insulin (vial any syringe). She will be transferred to [facility] in , and patient was instructed how to use insulin, how to adjust insulin per their protocol, and that they will be scheduled with Pregnancy Clinic for duration of pregnancy. Was sent insulin orders via  [employer].      OLGA Burt CDE  Time Spent: 15:13 minutes     Any diabetes medication dose changes were made via the CDE Protocol and Collaborative Practice Agreement with the patient's OB/GYN provider. A copy of this encounter was shared with the provider.

## 2021-01-18 NOTE — LETTER
1/18/2021         RE: Isabel Perrin  [address]  [place] [address]        Dear Colleague,    Thank you for referring your patient, Isabel Perrin, to the [facility]. Please see a copy of my visit note below.    No notes on file    Gestational Diabetes Follow-up     Subjective/Objective:     Isabel Perrin was called for a scheduled BG review. Last date of communication was: 1/14/2021.     Gestational diabetes is being managed with diet and activity     Taking diabetes medications: no     Estimated Date of Delivery: Data Unavailable     Blood Glucose/Ketone Log:      Date Ketones Fasting Post Breakfast Post Lunch Post Supper   1/14   100 120 126 113   1/15 small 98 128 130 122   1/16 trace 98 119 132 121   1/17 moderate 96 122 134 97   1/18 trace 96 125 132 112   1/19 small 99 130 125 111   1/20   98 126             Assessment:     Ketones: small-moderate.   Fasting blood glucoses: 0% in target.  After breakfast: 100% in target.  After lunch: 100% in target.  After dinner: 100% in target.  Plan/Response:  Instructed patient on how to use insulin (vial any syringe). She will be transferred to [facility]regnancy Clinic in , and patient was instructed how to use insulin, how to adjust insulin per their protocol, and that they will be scheduled with Pregnancy Clinic for duration of pregnancy. Was sent insulin orders via  [employer].      OLGA Burt CDE  Time Spent: 15:13 minutes     Any diabetes medication dose changes were made via the CDE Protocol and Collaborative Practice Agreement with the patient's OB/GYN provider. A copy of this encounter was shared with the provider.

## 2021-01-18 NOTE — TELEPHONE ENCOUNTER
Call to Isabel to follow-up on fasting glucose and ketones results after our visit last Thursday, 1/14. No answer, left message to call back.     Silvia Chavarria, MPH, RDN, LD, [facility]

## 2021-01-20 ENCOUNTER — AMBULATORY - HEALTHEAST (OUTPATIENT)
Dept: EDUCATION SERVICES | Facility: CLINIC | Age: 41
End: 2021-01-20

## 2021-01-20 ENCOUNTER — COMMUNICATION - HEALTHEAST (OUTPATIENT)
Dept: EDUCATION SERVICES | Facility: CLINIC | Age: 41
End: 2021-01-20

## 2021-01-20 DIAGNOSIS — O24.414 INSULIN CONTROLLED GESTATIONAL DIABETES MELLITUS (GDM) IN THIRD TRIMESTER: ICD-10-CM

## 2021-01-20 NOTE — TELEPHONE ENCOUNTER
Gestational Diabetes Follow-up    Subjective/Objective:    Isabel Perrin was called for a scheduled BG review. Last date of communication was: 1/14/2021.    Gestational diabetes is being managed with diet and activity    Taking diabetes medications: no    Estimated Date of Delivery: Data Unavailable    Blood Glucose/Ketone Log:    Date Ketones Fasting Post Breakfast Post Lunch Post Supper   1/14  100 120 126 113   1/15 small 98 128 130 122   1/16 trace 98 119 132 121   1/17 moderate 96 122 134 97   1/18 trace 96 125 132 112   1/19 small 99 130 125 111   1/20  98 126         Assessment:    Ketones: small-moderate.   Fasting blood glucoses: 0% in target.  After breakfast: 100% in target.  After lunch: 100% in target.  After dinner: 100% in target.    Plan/Response:  Instructed patient on how to use insulin (vial any syringe). She will be transferred to [facility]regnancy [facility] in , and patient was instructed how to use insulin, how to adjust insulin per their protocol, and that they will be scheduled with Pregnancy Clinic for duration of pregnancy. Was sent insulin orders via  [employer].     OLGA Burt CDE  Time Spent: 15:13 minutes    Any diabetes medication dose changes were made via the CDE Protocol and Collaborative Practice Agreement with the patient's OB/GYN provider. A copy of this encounter was shared with the provider.

## 2021-01-27 ENCOUNTER — COMMUNICATION - HEALTHEAST (OUTPATIENT)
Dept: EDUCATION SERVICES | Facility: CLINIC | Age: 41
End: 2021-01-27

## 2021-02-09 ENCOUNTER — AMBULATORY - HEALTHEAST (OUTPATIENT)
Dept: OBGYN | Facility: HOSPITAL | Age: 41
End: 2021-02-09

## 2021-02-09 DIAGNOSIS — Z11.59 ENCOUNTER FOR SCREENING FOR OTHER VIRAL DISEASES: ICD-10-CM

## 2021-03-08 DIAGNOSIS — Z11.59 SCREENING FOR VIRAL DISEASE: Primary | ICD-10-CM

## 2021-03-08 LAB
SARS-COV-2 RNA RESP QL NAA+PROBE: NORMAL
SPECIMEN SOURCE: NORMAL

## 2021-03-08 PROCEDURE — U0003 INFECTIOUS AGENT DETECTION BY NUCLEIC ACID (DNA OR RNA); SEVERE ACUTE RESPIRATORY SYNDROME CORONAVIRUS 2 (SARS-COV-2) (CORONAVIRUS DISEASE [COVID-19]), AMPLIFIED PROBE TECHNIQUE, MAKING USE OF HIGH THROUGHPUT TECHNOLOGIES AS DESCRIBED BY CMS-2020-01-R: HCPCS | Performed by: OBSTETRICS & GYNECOLOGY

## 2021-03-08 PROCEDURE — U0005 INFEC AGEN DETEC AMPLI PROBE: HCPCS | Performed by: OBSTETRICS & GYNECOLOGY

## 2021-03-09 LAB
LABORATORY COMMENT REPORT: NORMAL
SARS-COV-2 RNA RESP QL NAA+PROBE: NEGATIVE
SPECIMEN SOURCE: NORMAL

## 2021-03-11 ENCOUNTER — SURGERY - HEALTHEAST (OUTPATIENT)
Dept: OBGYN | Facility: HOSPITAL | Age: 41
End: 2021-03-11

## 2021-03-11 ENCOUNTER — ANESTHESIA - HEALTHEAST (OUTPATIENT)
Dept: OBGYN | Facility: HOSPITAL | Age: 41
End: 2021-03-11

## 2021-03-11 ENCOUNTER — COMMUNICATION - HEALTHEAST (OUTPATIENT)
Dept: SCHEDULING | Facility: CLINIC | Age: 41
End: 2021-03-11

## 2021-03-11 ASSESSMENT — MIFFLIN-ST. JEOR: SCORE: 1794.87

## 2021-04-28 LAB
HPV ABSTRACT: ABNORMAL
PAP SMEAR - HIM PATIENT REPORTED: NEGATIVE

## 2021-06-01 VITALS
BODY MASS INDEX: 33.99 KG/M2 | BODY MASS INDEX: 33.99 KG/M2 | BODY MASS INDEX: 32.7 KG/M2 | HEIGHT: 64 IN | WEIGHT: 198 LBS | WEIGHT: 198 LBS | HEIGHT: 64 IN | BODY MASS INDEX: 32.7 KG/M2

## 2021-06-02 VITALS — WEIGHT: 249 LBS | BODY MASS INDEX: 41.48 KG/M2 | HEIGHT: 65 IN

## 2021-06-05 VITALS — BODY MASS INDEX: 41.65 KG/M2 | HEIGHT: 65 IN | WEIGHT: 250 LBS

## 2021-06-14 NOTE — TELEPHONE ENCOUNTER
Rx sent to pharmacy.  Please contact patient to get scheduled in pregnancy clinic next week.  Okay to DB if needed.  Thanks,  Juliette

## 2021-06-14 NOTE — TELEPHONE ENCOUNTER
Gestational Diabetes Follow-up     Subjective/Objective:     Isabel Perrin was called for a scheduled BG review. Last date of communication was: 1/14/2021.     Gestational diabetes is being managed with diet and activity     Taking diabetes medications: no     Estimated Date of Delivery: Data Unavailable     Blood Glucose/Ketone Log:     Date Ketones Fasting Post Breakfast Post Lunch Post Supper   1/14   100 120 126 113   1/15 small 98 128 130 122   1/16 trace 98 119 132 121   1/17 moderate 96 122 134 97   1/18 trace 96 125 132 112   1/19 small 99 130 125 111   1/20   98 126             Assessment:     Ketones: small-moderate.   Fasting blood glucoses: 0% in target.  After breakfast: 100% in target.  After lunch: 100% in target.  After dinner: 100% in target.     Plan/Response:  Instructed patient on how to use insulin (vial any syringe). She will be transferred to [facility]regnancy Clinic in , and patient was instructed how to use insulin, how to adjust insulin per their protocol, and that they will be scheduled with [facility]regnancy Clinic for duration of pregnancy. Was sent insulin orders via  [employer].      OLGA Burt CDE  Time Spent: 15:13 minutes     Any diabetes medication dose changes were made via the CDE Protocol and Collaborative Practice Agreement with the patient's OB/GYN provider. A copy of this encounter was shared with the provider

## 2021-06-14 NOTE — TELEPHONE ENCOUNTER
Pt is being seen by [facility] diabetes educators. BG testing supplies and urine ketone strips ordered.    Ciara Salmeron RN, [facility]

## 2021-06-14 NOTE — TELEPHONE ENCOUNTER
Patient has been followed by [facility] for gestational diabetes and now has elevated fasting glucose levels, requiring insulin start. Recommend NPH insulin at bedtime and for patient to get established with the [facility]ology [facility] with Andreia Cassidy NP.    Patient's preferred pharmacy: [facility] [place] ([place], likely needs vial insulin, was instructed this way)    Once insulin is ordered, please route to scheduling team to make appointment with End[facility]inology [facility]regnancy [facility].    Lorenza BLANOTNES

## 2021-06-14 NOTE — TELEPHONE ENCOUNTER
I did send a Communication Management fax to Andreia prior to noon today in the [facility] instance of Epic. If she cannot see it, can you Lync (Or Teams) message me and I can copy it?    Thanks,  Lorenza WILSON [facility]

## 2021-06-14 NOTE — TELEPHONE ENCOUNTER
Lorenza can you provide your note/BG readings, we are unable to see any of your notes. Andreia is requiring this for insulin Rx.     Thanks!

## 2021-06-15 NOTE — ANESTHESIA PROCEDURE NOTES
Peripheral Block    Patient location during procedure: pre-op  Start time: 3/11/2021 8:51 AM  End time: 3/11/2021 8:55 AM  post-op analgesia per surgeon order as noted in medical record  Staffing:  Performing  Anesthesiologist: Jennie Monahan MD  Preanesthetic Checklist  Completed: patient identified, site marked, risks, benefits, and alternatives discussed, timeout performed, consent obtained, airway assessed, oxygen available, suction available, emergency drugs available and hand hygiene performed  Peripheral Block  Block type: other, TAP  Prep: ChloraPrep  Patient position: supine  Patient monitoring: blood pressure, heart rate, continuous pulse oximetry and cardiac monitor  Laterality: bilateral, same technique used bilaterally  Injection technique: ultrasound guided    Ultrasound used to visualize needle placement in proximity to nerve being blocked: yes   US used to visualize anesthetic spread  Visualized anatomic structures normal  No Pathological Findings  Permanent ultrasound image captured for medical record  Sterile gel and probe cover used for ultrasound.  Needle  Needle type: Stimuplex   Needle gauge: 20G  Needle length: 4 in  no peripheral nerve catheter placed  Assessment  Injection assessment: no difficulty with injection

## 2021-06-15 NOTE — ANESTHESIA CARE TRANSFER NOTE
Last vitals:   Vitals:    03/11/21 0910   BP: 122/69   Pulse: 77   Resp: 16   Temp: 36.6  C (97.8  F)   SpO2: 100%     Patient's level of consciousness is awake  Spontaneous respirations: yes  Maintains airway independently: yes  Dentition unchanged: yes  Oropharynx: oropharynx clear of all foreign objects    QCDR Measures:  ASA# 20 - Surgical Safety Checklist: WHO surgical safety checklist completed prior to induction    PQRS# 430 - Adult PONV Prevention: 4558F - Pt received => 2 anti-emetic agents (different classes) preop & intraop  ASA# 8 - Peds PONV Prevention: NA - Not pediatric patient, not GA or 2 or more risk factors NOT present  PQRS# 424 - Itzel-op Temp Management: 4559F - At least one body temp DOCUMENTED => 35.5C or 95.9F within required timeframe  PQRS# 426 - PACU Transfer Protocol: - Transfer of care checklist used  ASA# 14 - Acute Post-op Pain: ASA14B - Patient did NOT experience pain >= 7 out of 10

## 2021-06-15 NOTE — ANESTHESIA POSTPROCEDURE EVALUATION
[facility]  [address] [address]  Dept: [phone]      EMTALA TRANSFER CONSENT    NAME Hernan Granado                                         1945                              MRN [id number]    I have been informed of my rights regarding examination, treatment, and transfer   by Dr Rebel Pichardo: Specialized equipment and/or services available at the receiving facility (Include comment)________________________    Risks: Potential for delay in receiving treatment      Consent for Transfer:  I acknowledge that my medical condition has been evaluated and explained to me by the emergency department physician or other qualified medical person and/or my attending physician, who has recommended that I be transferred to the service of  Accepting Physician:  (Dr Keiry Lozano) at [address], [address] :  ([employer])  The above potential benefits of such transfer, the potential risks associated with such transfer, and the probable risks of not being transferred have been explained to me, and I fully understand them  The doctor has explained that, in my case, the benefits of transfer outweigh the risks  I agree to be transferred  I authorize the performance of emergency medical procedures and treatments upon me in both transit and upon arrival at the receiving facility  Additionally, I authorize the release of any and all medical records to the receiving facility and request they be transported with me, if possible  I understand that the safest mode of transportation during a medical emergency is an ambulance and that the Hospital advocates the use of this mode of transport  Risks of traveling to the receiving facility by car, including absence of medical control, life sustaining equipment, such as oxygen, and medical personnel has been explained to me and I fully understand them      (LARON CORRECT BOX BELOW)  [ x ] Patient: Isabel Perrin  Procedure(s):  REPEAT  SECTION  Anesthesia type: spinal    Patient location: Labor and Delivery  Last vitals:   Vitals Value Taken Time   /69 21 0800   Temp 36.8  C (98.2  F) 21 0800   Pulse 73 21 0800   Resp 16 21 0800   SpO2 99 % 21     Post vital signs: stable  Level of consciousness: awake and responds to simple questions  Post-anesthesia pain: pain controlled  Post-anesthesia nausea and vomiting: no  Pulmonary: unassisted, return to baseline  Cardiovascular: stable and blood pressure at baseline  Hydration: adequate  Anesthetic events: no    QCDR Measures:  ASA# 11 - Itzel-op Cardiac Arrest: ASA11B - Patient did NOT experience unanticipated cardiac arrest  ASA# 12 - Itzel-op Mortality Rate: ASA12B - Patient did NOT die  ASA# 13 - PACU Re-Intubation Rate: NA - No ETT / LMA used for case  ASA# 10 - Composite Anes Safety: ASA10A - No serious adverse event    Additional Notes:   I consent to the stated transfer and to be transported by ambulance/helicopter  [  ]  I consent to the stated transfer, but refuse transportation by ambulance and accept full responsibility for my transportation by car  I understand the risks of non-ambulance transfers and I exonerate the Hospital and its staff from any deterioration in my condition that results from this refusal     X___________________________________________    DATE  18  TIME________  Signature of patient or legally responsible individual signing on patient behalf           RELATIONSHIP TO PATIENT_________________________          Provider Certification    NAME Hernan Granado                                         1945                              MRN [id number]    A medical screening exam was performed on the above named patient  Based on the examination:    Condition Necessitating Transfer The primary encounter diagnosis was C1 cervical fracture ([address] )  Diagnoses of Closed nondisplaced fracture of fourth metatarsal bone of right foot, initial encounter, Complicated laceration of lip, initial encounter, Closed head injury, initial encounter, and Tooth fracture were also pertinent to this visit      Patient Condition: The patient has been stabilized such that within reasonable medical probability, no material deterioration of the patient condition or the condition of the unborn child(violet) is likely to result from the transfer    Reason for Transfer: Level of Care needed not available at this facility    Transfer Requirements: Facility  ([facility])   · Space available and qualified personnel available for treatment as acknowledged by  PACS  · Agreed to accept transfer and to provide appropriate medical treatment as acknowledged by        (Dr Keiry Lozano)  · Appropriate medical records of the examination and treatment of the patient are provided at the time of transfer   [address] _______  · Transfer will be performed by qualified personnel from  [facility]  and appropriate transfer equipment as required, including the use of necessary and appropriate life support measures  Provider Certification: I have examined the patient and explained the following risks and benefits of being transferred/refusing transfer to the patient/family:  General risk, such as traffic hazards, adverse weather conditions, rough terrain or turbulence, possible failure of equipment (including vehicle or aircraft), or consequences of actions of persons outside the control of the transport personnel      Based on these reasonable risks and benefits to the patient and/or the unborn child(violet), and based upon the information available at the time of the patients examination, I certify that the medical benefits reasonably to be expected from the provision of appropriate medical treatments at another medical facility outweigh the increasing risks, if any, to the individuals medical condition, and in the case of labor to the unborn child, from effecting the transfer      X____________________________________________ DATE 03/02/18        TIME_______      ORIGINAL - SEND TO MEDICAL RECORDS   COPY - SEND WITH PATIENT DURING TRANSFER

## 2021-06-15 NOTE — ANESTHESIA PREPROCEDURE EVALUATION
Anesthesia Evaluation      Patient summary reviewed   No history of anesthetic complications     Airway   Mallampati: I  Neck ROM: full   Pulmonary - negative ROS and normal exam                          Cardiovascular - negative ROS and normal exam  Exercise tolerance: > or = 4 METS  (-) murmur  Rhythm: regular  Rate: normal,    no murmur      Neuro/Psych - negative ROS     Endo/Other    (+) hypothyroidism, obesity, pregnant     GI/Hepatic/Renal    (+) GERD,        Other findings: MTHnl coags this morning off anticoagulants      Dental - normal exam                          Anesthesia Plan  Planned anesthetic: spinal, peripheral nerve block and ITN    ASA 3     Anesthetic plan and risks discussed with: patient and spouse  Anesthesia plan special considerations: antiemetics,   Post-op plan: routine recovery

## 2021-06-15 NOTE — ANESTHESIA PROCEDURE NOTES
Spinal Block    Patient location during procedure: OR  Start time: 3/11/2021 7:33 AM  End time: 3/11/2021 7:38 AM  Reason for block: primary anesthetic    Staffing:  Performing  Anesthesiologist: Torres Reddy MD    Preanesthetic Checklist  Completed: patient identified, risks, benefits, and alternatives discussed, timeout performed, consent obtained, airway assessed, oxygen available, suction available, emergency drugs available and hand hygiene performed  Spinal Block  Patient position: sitting  Prep: Betadine  Patient monitoring: heart rate and continuous pulse ox  Approach: midline  Location: L2-3  Injection technique: single-shot  Needle type: pencil-tip   Needle gauge: 24 G    Assessment  Sensory level: T6

## 2021-06-16 PROBLEM — Z34.90 PREGNANCY: Status: ACTIVE | Noted: 2021-03-11

## 2021-06-16 PROBLEM — Z98.891 S/P REPEAT LOW TRANSVERSE C-SECTION: Status: ACTIVE | Noted: 2021-03-11

## 2021-06-16 PROBLEM — Z98.891 S/P C-SECTION: Status: ACTIVE | Noted: 2018-11-15

## 2021-06-21 NOTE — ANESTHESIA CARE TRANSFER NOTE
Last vitals:   Vitals:    11/15/18 0911   BP: 114/59   Pulse: 77   Resp: 16   Temp: 36.4  C (97.5  F)   SpO2: 100%     Patient's level of consciousness is drowsy  Spontaneous respirations: yes  Maintains airway independently: yes  Dentition unchanged: yes  Oropharynx: oropharynx clear of all foreign objects    QCDR Measures:  ASA# 20 - Surgical Safety Checklist: WHO surgical safety checklist completed prior to induction    PQRS# 430 - Adult PONV Prevention: 4558F - Pt received => 2 anti-emetic agents (different classes) preop & intraop  ASA# 8 - Peds PONV Prevention: NA - Not pediatric patient, not GA or 2 or more risk factors NOT present  PQRS# 424 - Itzel-op Temp Management: 4559F - At least one body temp DOCUMENTED => 35.5C or 95.9F within required timeframe  PQRS# 426 - PACU Transfer Protocol: - Transfer of care checklist used  ASA# 14 - Acute Post-op Pain: ASA14B - Patient did NOT experience pain >= 7 out of 10

## 2021-06-21 NOTE — ANESTHESIA POSTPROCEDURE EVALUATION
Patient: Isabel Perrin  PRIMARY LOW TRANSVERSE  SECTION  Anesthesia type: spinal    Patient location: Labor and Delivery  Last vitals:   Vitals:    18 0845   BP: 97/66   Pulse: 90   Resp: 18   Temp: 36.8  C (98.3  F)   SpO2: 99%     Post vital signs: stable  Level of consciousness: awake and responds to simple questions  Post-anesthesia pain: pain controlled  Post-anesthesia nausea and vomiting: no  Pulmonary: unassisted, return to baseline  Cardiovascular: stable and blood pressure at baseline  Hydration: adequate  Anesthetic events: no    QCDR Measures:  ASA# 11 - Itzel-op Cardiac Arrest: ASA11B - Patient did NOT experience unanticipated cardiac arrest  ASA# 12 - Itzel-op Mortality Rate: ASA12B - Patient did NOT die  ASA# 13 - PACU Re-Intubation Rate: ASA13B - Patient did NOT require a new airway mgmt  ASA# 10 - Composite Anes Safety: ASA10A - No serious adverse event    Additional Notes:

## 2021-06-21 NOTE — ANESTHESIA PROCEDURE NOTES
Spinal Block    Patient location during procedure: OR  Start time: 11/15/2018 7:38 AM  End time: 11/15/2018 7:43 AM  Reason for block: primary anesthetic    Staffing:  Performing  Anesthesiologist: Torres Reddy MD    Preanesthetic Checklist  Completed: patient identified, risks, benefits, and alternatives discussed, timeout performed, consent obtained, airway assessed, oxygen available, suction available, emergency drugs available and hand hygiene performed  Spinal Block  Patient position: sitting  Prep: Betadine  Patient monitoring: heart rate, continuous pulse ox and blood pressure  Approach: midline  Location: L2-3  Injection technique: single-shot  Needle type: pencil-tip   Needle gauge: 24 G    Assessment  Sensory level: T6

## 2021-06-21 NOTE — ANESTHESIA PROCEDURE NOTES
Peripheral Block    Patient location during procedure: OR  Start time: 11/15/2018 8:50 AM  End time: 11/15/2018 8:56 AM  post-op analgesia per surgeon order as noted in medical record  Staffing:  Performing  Anesthesiologist: Torres Reddy MD  Preanesthetic Checklist  Completed: patient identified, site marked, risks, benefits, and alternatives discussed, timeout performed, consent obtained, airway assessed, oxygen available, suction available, emergency drugs available and hand hygiene performed  Peripheral Block  Block type: other, TAP  Prep: ChloraPrep  Patient position: supine  Patient monitoring: blood pressure, heart rate, continuous pulse oximetry and cardiac monitor  Laterality: bilateral, same technique used bilaterally  Injection technique: ultrasound guided    Ultrasound used to visualize needle placement in proximity to nerve being blocked: yes   Permanent ultrasound image captured for medical record      Needle  Needle type: Stimuplex   Needle gauge: 20G  Needle length: 6 in  no peripheral nerve catheter placed  Assessment  Injection assessment: no difficulty with injection, negative aspiration for heme, no paresthesia on injection and incremental injection

## 2021-07-03 NOTE — PROGRESS NOTES
"Follow up visit    Patient back from [facility], following up for thyroid labs. Also requests all hormone labs be performed as she is trying to get pregnant and wants to know her likelihood of pregnancy. Having difficulty with achieving orgasm since  returned from [facility]. Patient has a history of an infib, but has been able to achieve orgasm in the past. Since he returned, she has not been able to. She denies problems/issues in the relationship.     O:  /77 (BP Location: Left arm, Patient Position: Chair)  Pulse 87  Ht 1.575 m (5' 2\")  Wt 88 kg (193 lb 14.4 oz)  LMP 2017  Breastfeeding? No  BMI 35.46 kg/m2  Gen: alert, NAD  Resp: Nonlabored    A/P:  Isabel Perrin is a 36 year old  with hyperthyroidism presenting for follow up thyroid labs and to have fertility testing performed as she wants to become pregnant. Day 3 labs ordered, TSH. Will need to call patient with results and adjust meds as necessary. After labs, patient should make a follow up appointment to discuss lack of orgasm further and fertility labs. Instructions given on ovulation predictor kits.    Discussed with Dr. Dyllan Dukes MD  Obstetrics and Gynecology PGY-4     The Patient was seen in [facility] by SALEEM DUKES.  I reviewed the history & exam. Assessment and plan were jointly made.    Gwendolyn Mijares MD      " No

## 2021-07-20 DIAGNOSIS — Z11.59 ENCOUNTER FOR SCREENING FOR OTHER VIRAL DISEASES: ICD-10-CM

## 2021-07-27 ENCOUNTER — IMMUNIZATION (OUTPATIENT)
Dept: NURSING | Facility: CLINIC | Age: 41
End: 2021-07-27
Payer: COMMERCIAL

## 2021-07-27 PROCEDURE — 91300 PR COVID VAC PFIZER DIL RECON 30 MCG/0.3 ML IM: CPT

## 2021-07-27 PROCEDURE — 0001A PR COVID VAC PFIZER DIL RECON 30 MCG/0.3 ML IM: CPT

## 2021-08-02 ENCOUNTER — LAB (OUTPATIENT)
Dept: LAB | Facility: CLINIC | Age: 41
End: 2021-08-02
Attending: OBSTETRICS & GYNECOLOGY

## 2021-08-02 DIAGNOSIS — Z11.59 ENCOUNTER FOR SCREENING FOR OTHER VIRAL DISEASES: ICD-10-CM

## 2021-08-02 PROCEDURE — U0003 INFECTIOUS AGENT DETECTION BY NUCLEIC ACID (DNA OR RNA); SEVERE ACUTE RESPIRATORY SYNDROME CORONAVIRUS 2 (SARS-COV-2) (CORONAVIRUS DISEASE [COVID-19]), AMPLIFIED PROBE TECHNIQUE, MAKING USE OF HIGH THROUGHPUT TECHNOLOGIES AS DESCRIBED BY CMS-2020-01-R: HCPCS

## 2021-08-02 PROCEDURE — U0005 INFEC AGEN DETEC AMPLI PROBE: HCPCS

## 2021-08-02 NOTE — PROGRESS NOTES
[facility] [place]  [address], [address]  [place] [address]  Phone: [phone]  Fax: [phone]  Primary Provider: No Ref-Primary, Physician  Pre-op Performing Provider: CARMNE IBRAHIM    PREOPERATIVE EVALUATION:  Today's date: 8/3/2021    Isabel Perrin is a 40 year old female who presents for a preoperative evaluation.    Surgical Information:  Surgery/Procedure: LAPAROSCOPIC LEFT OVARIAN CYSTECTOMY  Surgery Location: [facility]  Surgeon:    Phil Palumbo MD     Surgery Date: 8/4/2021  Time of Surgery: 9:30 AM   Where patient plans to recover: At home with family  Fax number for surgical facility: Note does not need to be faxed, will be available electronically in Epic.    Type of Anesthesia Anticipated: General    Assessment & Plan     The proposed surgical procedure is considered INTERMEDIATE risk.    Preop general physical exam    Hypothyroidism due to Hashimoto's thyroiditis  - TSH with free T4 reflex  - Comprehensive metabolic panel (BMP + Alb, Alk Phos, ALT, AST, Total. Bili, TP)    Iron deficiency  - CBC with platelets and differential  - Iron and iron binding capacity    Absence of menstruation  - HCG qualitative urine         Risks and Recommendations:  The patient has the following additional risks and recommendations for perioperative complications:   - No identified additional risk factors other than previously addressed    Medication Instructions:  Hold ASA, ibuprofen, naproxen for pain. You can take acetaminophen/tylenol  Hold all medications morning of surgery, only take levothyroxine.    RECOMMENDATION:  APPROVAL GIVEN to proceed with proposed procedure, without further diagnostic evaluation.      30  minutes spent on the date of the encounter doing chart review, history and exam, documentation and further activities per the note        Subjective     HPI related to upcoming procedure:  Patient is scheduled for above surgery due to Complex cyst of   left ovary  LMP 07/07/2021      IMPRESSION  IMPRESSION:  Complex cyst of the left ovary. No sonographic evidence of left ovarian torsion.  Limited visualization of vascularity of the right ovary due to its posterior position.  No free fluid.     Preop Questions 8/3/2021   1. Have you ever had a heart attack or stroke? No   2. Have you ever had surgery on your heart or blood vessels, such as a stent placement, a coronary artery bypass, or surgery on an artery in your head, neck, heart, or legs? No   3. Do you have chest pain with activity? No   4. Do you have a history of  heart failure? No   5. Do you currently have a cold, bronchitis or symptoms of other infection? No   6. Do you have a cough, shortness of breath, or wheezing? No   7. Do you or anyone in your family have previous history of blood clots? No   8. Do you or does anyone in your family have a serious bleeding problem such as prolonged bleeding following surgeries or cuts? No   9. Have you ever had problems with anemia or been told to take iron pills? No   10. Have you had any abnormal blood loss such as black, tarry or bloody stools, or abnormal vaginal bleeding? No   11. Have you ever had a blood transfusion? No   12. Are you willing to have a blood transfusion if it is medically needed before, during, or after your surgery? NO -    13. Have you or any of your relatives ever had problems with anesthesia? No   14. Do you have sleep apnea, excessive snoring or daytime drowsiness? No   15. Do you have any artifical heart valves or other implanted medical devices like a pacemaker, defibrillator, or continuous glucose monitor? No   16. Do you have artificial joints? No   17. Are you allergic to latex? No   18. Is there any chance that you may be pregnant? UNKNOWN -      Health Care Directive:  Patient does not have a Health Care Directive or Living Will: Discussed advance care planning with patient; information given to patient to review.    Preoperative  Review of :   reviewed - no record of controlled substances prescribed.      Status of Chronic Conditions:  HYPOTHYROIDISM - Patient has a longstanding history of chronic Hypothyroidism. Patient has been doing well, noting no tremor, insomnia, hair loss or changes in skin texture. Continues to take medications as directed, without adverse reactions or side effects. Last TSH   Lab Results   Component Value Date    TSH 2.39 2021   .        Review of Systems  CONSTITUTIONAL: NEGATIVE for fever, chills, change in weight  INTEGUMENTARY/SKIN: NEGATIVE for worrisome rashes, moles or lesions  EYES: NEGATIVE for vision changes or irritation  ENT/MOUTH: NEGATIVE for ear, mouth and throat problems  RESP: NEGATIVE for significant cough or SOB  CV: NEGATIVE for chest pain, palpitations or peripheral edema  GI: NEGATIVE for nausea, abdominal pain, heartburn, or change in bowel habits  : NEGATIVE for frequency, dysuria, or hematuria  MUSCULOSKELETAL: NEGATIVE for significant arthralgias or myalgia  NEURO: NEGATIVE for weakness, dizziness or paresthesias  ENDOCRINE: NEGATIVE for temperature intolerance, skin/hair changes  HEME: NEGATIVE for bleeding problems  PSYCHIATRIC: NEGATIVE for changes in mood or affect    Patient Active Problem List    Diagnosis Date Noted     Pregnancy 2021     Priority: Medium     S/P repeat low transverse  2021     Priority: Medium     S/P  11/15/2018     Priority: Medium      Past Medical History:   Diagnosis Date     Blood dyscrasia      Diabetes (H)     had gestational diabetes     Disease of thyroid gland      Female infertility      Hypothyroid      MTHFR deficiency complicating pregnancy (H)      Past Surgical History:   Procedure Laterality Date      SECTION N/A 11/15/2018    Procedure: PRIMARY LOW TRANSVERSE  SECTION;  Surgeon: Denise Sparks MD;  Location: [facility]D OR;  Service: Obstetrics      SECTION N/A 3/11/2021  "   Procedure: REPEAT  SECTION;  Surgeon: Denise Sparks MD;  Location: [facility] OR;  Service: Obstetrics     DAVINCI MYOMECTOMY  3/8/2012    Procedure:DAVINCI MYOMECTOMY; Davinci Myomectomy Converted to Open Mini-Laparotomy; Surgeon:RASHMI SERNA; Location: OR     EXCISE BREAST CYST/FIBROADENOMA/TUMOR/DUCT LESION/NIPPLE LESION/AREOLAR LESION      right fibroadenoma     MYOMECTOMY  ,      SHOULDER SURGERY      left shoulder     SHOULDER SURGERY Left      Current Outpatient Medications   Medication Sig Dispense Refill     levothyroxine (SYNTHROID/LEVOTHROID) 75 MCG tablet Take 75 mcg by mouth daily       Prenatal Vit-Fe Fumarate-FA (PRENATAL MULTIVITAMIN  WITH IRON) 28-0.8 MG TABS Take 1 tablet by mouth daily 100 each 3     VITAMIN D, CHOLECALCIFEROL, PO Take 5,000 Units by mouth daily         No Known Allergies     Social History     Tobacco Use     Smoking status: Never Smoker     Smokeless tobacco: Never Used   Substance Use Topics     Alcohol use: No     Family History   Problem Relation Age of Onset     Hypertension Mother      Breast Cancer No family hx of      Cancer - colorectal No family hx of      Diabetes No family hx of      C.A.D. No family hx of      Gynecology No family hx of         no history of fibroids     History   Drug Use No         Objective     /82   Pulse 76   Temp 97.6  F (36.4  C) (Temporal)   Resp 16   Ht 1.651 m (5' 5\")   Wt 105.1 kg (231 lb 11.2 oz)   LMP 2021 (Exact Date)   SpO2 99%   Breastfeeding No   BMI 38.56 kg/m      Physical Exam    GENERAL APPEARANCE: healthy, alert and no distress     EYES: EOMI, PERRL     HENT: ear canals and TM's normal and nose and mouth without ulcers or lesions     NECK: no adenopathy, no asymmetry, masses, or scars and thyroid normal to palpation     RESP: lungs clear to auscultation - no rales, rhonchi or wheezes     CV: regular rates and rhythm, normal S1 S2, no S3 or S4 and no murmur, click or rub    "  ABDOMEN:  soft, nontender, no HSM or masses and bowel sounds normal     MS: extremities normal- no gross deformities noted, no evidence of inflammation in joints, FROM in all extremities.     SKIN: no suspicious lesions or rashes     NEURO: Normal strength and tone, sensory exam grossly normal, mentation intact and speech normal     PSYCH: mentation appears normal. and affect normal/bright     LYMPHATICS: No cervical adenopathy    Recent Labs   Lab Test 03/12/21  0623 03/11/21  0534   HGB 11.6* 13.9        Diagnostics:  Results for orders placed or performed in visit on 08/03/21   TSH with free T4 reflex     Status: Normal   Result Value Ref Range    TSH 2.39 0.40 - 4.00 mU/L   Comprehensive metabolic panel (BMP + Alb, Alk Phos, ALT, AST, Total. Bili, TP)     Status: Normal   Result Value Ref Range    Sodium 136 133 - 144 mmol/L    Potassium 3.9 3.4 - 5.3 mmol/L    Chloride 108 94 - 109 mmol/L    Carbon Dioxide (CO2) 25 20 - 32 mmol/L    Anion Gap 3 3 - 14 mmol/L    Urea Nitrogen 9 7 - 30 mg/dL    Creatinine 0.74 0.52 - 1.04 mg/dL    Calcium 8.5 8.5 - 10.1 mg/dL    Glucose 98 70 - 99 mg/dL    Alkaline Phosphatase 79 40 - 150 U/L    AST 9 0 - 45 U/L    ALT 18 0 - 50 U/L    Protein Total 7.7 6.8 - 8.8 g/dL    Albumin 3.5 3.4 - 5.0 g/dL    Bilirubin Total 0.8 0.2 - 1.3 mg/dL    GFR Estimate >90 >60 mL/min/1.73m2   Iron and iron binding capacity     Status: Normal   Result Value Ref Range    Iron 80 35 - 180 ug/dL    Iron Binding Capacity 300 240 - 430 ug/dL    Iron Sat Index 27 15 - 46 %   HCG qualitative urine     Status: Normal   Result Value Ref Range    hCG Urine Qualitative Negative Negative   CBC with platelets and differential     Status: None   Result Value Ref Range    WBC Count 5.1 4.0 - 11.0 10e3/uL    RBC Count 4.68 3.80 - 5.20 10e6/uL    Hemoglobin 13.9 11.7 - 15.7 g/dL    Hematocrit 42.2 35.0 - 47.0 %    MCV 90 78 - 100 fL    MCH 29.7 26.5 - 33.0 pg    MCHC 32.9 31.5 - 36.5 g/dL    RDW 13.1 10.0 - 15.0 %     Platelet Count 249 150 - 450 10e3/uL    % Neutrophils 47 %    % Lymphocytes 43 %    % Monocytes 6 %    % Eosinophils 4 %    % Basophils 1 %    Absolute Neutrophils 2.4 1.6 - 8.3 10e3/uL    Absolute Lymphocytes 2.2 0.8 - 5.3 10e3/uL    Absolute Monocytes 0.3 0.0 - 1.3 10e3/uL    Absolute Eosinophils 0.2 0.0 - 0.7 10e3/uL    Absolute Basophils 0.0 0.0 - 0.2 10e3/uL   CBC with platelets and differential     Status: None    Narrative    The following orders were created for panel order CBC with platelets and differential.  Procedure                               Abnormality         Status                     ---------                               -----------         ------                     CBC with platelets and d...[[id number]]                      Final result                 Please view results for these tests on the individual orders.       Revised Cardiac Risk Index (RCRI):  The patient has the following serious cardiovascular risks for perioperative complications:   - No serious cardiac risks = 0 points     RCRI Interpretation: 1 point: Class II (low risk - 0.9% complication rate)           Signed Electronically by: Sarha Lopes MD  Copy of this evaluation report is provided to requesting physician.

## 2021-08-02 NOTE — PATIENT INSTRUCTIONS

## 2021-08-03 ENCOUNTER — ANESTHESIA EVENT (OUTPATIENT)
Dept: SURGERY | Facility: AMBULATORY SURGERY CENTER | Age: 41
End: 2021-08-03
Payer: COMMERCIAL

## 2021-08-03 ENCOUNTER — OFFICE VISIT (OUTPATIENT)
Dept: FAMILY MEDICINE | Facility: CLINIC | Age: 41
End: 2021-08-03
Payer: COMMERCIAL

## 2021-08-03 VITALS
SYSTOLIC BLOOD PRESSURE: 126 MMHG | RESPIRATION RATE: 16 BRPM | OXYGEN SATURATION: 99 % | BODY MASS INDEX: 38.6 KG/M2 | WEIGHT: 231.7 LBS | TEMPERATURE: 97.6 F | HEART RATE: 76 BPM | DIASTOLIC BLOOD PRESSURE: 82 MMHG | HEIGHT: 65 IN

## 2021-08-03 DIAGNOSIS — Z11.59 NEED FOR HEPATITIS C SCREENING TEST: ICD-10-CM

## 2021-08-03 DIAGNOSIS — E06.3 HYPOTHYROIDISM DUE TO HASHIMOTO'S THYROIDITIS: ICD-10-CM

## 2021-08-03 DIAGNOSIS — N91.2 ABSENCE OF MENSTRUATION: ICD-10-CM

## 2021-08-03 DIAGNOSIS — Z11.4 SCREENING FOR HIV (HUMAN IMMUNODEFICIENCY VIRUS): ICD-10-CM

## 2021-08-03 DIAGNOSIS — Z01.818 PREOP GENERAL PHYSICAL EXAM: Primary | ICD-10-CM

## 2021-08-03 DIAGNOSIS — E61.1 IRON DEFICIENCY: ICD-10-CM

## 2021-08-03 LAB
ALBUMIN SERPL-MCNC: 3.5 G/DL (ref 3.4–5)
ALP SERPL-CCNC: 79 U/L (ref 40–150)
ALT SERPL W P-5'-P-CCNC: 18 U/L (ref 0–50)
ANION GAP SERPL CALCULATED.3IONS-SCNC: 3 MMOL/L (ref 3–14)
AST SERPL W P-5'-P-CCNC: 9 U/L (ref 0–45)
BASOPHILS # BLD AUTO: 0 10E3/UL (ref 0–0.2)
BASOPHILS NFR BLD AUTO: 1 %
BILIRUB SERPL-MCNC: 0.8 MG/DL (ref 0.2–1.3)
BUN SERPL-MCNC: 9 MG/DL (ref 7–30)
CALCIUM SERPL-MCNC: 8.5 MG/DL (ref 8.5–10.1)
CHLORIDE BLD-SCNC: 108 MMOL/L (ref 94–109)
CO2 SERPL-SCNC: 25 MMOL/L (ref 20–32)
CREAT SERPL-MCNC: 0.74 MG/DL (ref 0.52–1.04)
EOSINOPHIL # BLD AUTO: 0.2 10E3/UL (ref 0–0.7)
EOSINOPHIL NFR BLD AUTO: 4 %
ERYTHROCYTE [DISTWIDTH] IN BLOOD BY AUTOMATED COUNT: 13.1 % (ref 10–15)
GFR SERPL CREATININE-BSD FRML MDRD: >90 ML/MIN/1.73M2
GLUCOSE BLD-MCNC: 98 MG/DL (ref 70–99)
HCG UR QL: NEGATIVE
HCT VFR BLD AUTO: 42.2 % (ref 35–47)
HGB BLD-MCNC: 13.9 G/DL (ref 11.7–15.7)
IRON SATN MFR SERPL: 27 % (ref 15–46)
IRON SERPL-MCNC: 80 UG/DL (ref 35–180)
LYMPHOCYTES # BLD AUTO: 2.2 10E3/UL (ref 0.8–5.3)
LYMPHOCYTES NFR BLD AUTO: 43 %
MCH RBC QN AUTO: 29.7 PG (ref 26.5–33)
MCHC RBC AUTO-ENTMCNC: 32.9 G/DL (ref 31.5–36.5)
MCV RBC AUTO: 90 FL (ref 78–100)
MONOCYTES # BLD AUTO: 0.3 10E3/UL (ref 0–1.3)
MONOCYTES NFR BLD AUTO: 6 %
NEUTROPHILS # BLD AUTO: 2.4 10E3/UL (ref 1.6–8.3)
NEUTROPHILS NFR BLD AUTO: 47 %
PLATELET # BLD AUTO: 249 10E3/UL (ref 150–450)
POTASSIUM BLD-SCNC: 3.9 MMOL/L (ref 3.4–5.3)
PROT SERPL-MCNC: 7.7 G/DL (ref 6.8–8.8)
RBC # BLD AUTO: 4.68 10E6/UL (ref 3.8–5.2)
SARS-COV-2 RNA RESP QL NAA+PROBE: NEGATIVE
SODIUM SERPL-SCNC: 136 MMOL/L (ref 133–144)
TIBC SERPL-MCNC: 300 UG/DL (ref 240–430)
TSH SERPL DL<=0.005 MIU/L-ACNC: 2.39 MU/L (ref 0.4–4)
WBC # BLD AUTO: 5.1 10E3/UL (ref 4–11)

## 2021-08-03 PROCEDURE — 36415 COLL VENOUS BLD VENIPUNCTURE: CPT | Performed by: FAMILY MEDICINE

## 2021-08-03 PROCEDURE — 81025 URINE PREGNANCY TEST: CPT | Performed by: FAMILY MEDICINE

## 2021-08-03 PROCEDURE — 83550 IRON BINDING TEST: CPT | Performed by: FAMILY MEDICINE

## 2021-08-03 PROCEDURE — 80050 GENERAL HEALTH PANEL: CPT | Performed by: FAMILY MEDICINE

## 2021-08-03 PROCEDURE — 99204 OFFICE O/P NEW MOD 45 MIN: CPT | Performed by: FAMILY MEDICINE

## 2021-08-03 RX ORDER — LEVOTHYROXINE SODIUM 75 UG/1
75 TABLET ORAL DAILY
COMMUNITY

## 2021-08-03 ASSESSMENT — MIFFLIN-ST. JEOR: SCORE: 1711.86

## 2021-08-04 ENCOUNTER — ANESTHESIA (OUTPATIENT)
Dept: SURGERY | Facility: AMBULATORY SURGERY CENTER | Age: 41
End: 2021-08-04
Payer: COMMERCIAL

## 2021-08-04 ENCOUNTER — HOSPITAL ENCOUNTER (OUTPATIENT)
Facility: AMBULATORY SURGERY CENTER | Age: 41
End: 2021-08-04
Attending: OBSTETRICS & GYNECOLOGY
Payer: COMMERCIAL

## 2021-08-04 VITALS
SYSTOLIC BLOOD PRESSURE: 109 MMHG | OXYGEN SATURATION: 99 % | TEMPERATURE: 97.1 F | BODY MASS INDEX: 39.44 KG/M2 | WEIGHT: 231 LBS | HEART RATE: 78 BPM | HEIGHT: 64 IN | RESPIRATION RATE: 16 BRPM | DIASTOLIC BLOOD PRESSURE: 63 MMHG

## 2021-08-04 DIAGNOSIS — R10.2 PELVIC PAIN: Primary | ICD-10-CM

## 2021-08-04 LAB
GLUCOSE POCT: 119 MG/DL (ref 70–99)
GLUCOSE SERPL-MCNC: 112 MG/DL (ref 70–99)
HCG UR QL: NEGATIVE
INTERNAL QC OK POCT: NORMAL

## 2021-08-04 RX ORDER — KETOROLAC TROMETHAMINE 30 MG/ML
INJECTION, SOLUTION INTRAMUSCULAR; INTRAVENOUS PRN
Status: DISCONTINUED | OUTPATIENT
Start: 2021-08-04 | End: 2021-08-04

## 2021-08-04 RX ORDER — PROPOFOL 10 MG/ML
INJECTION, EMULSION INTRAVENOUS CONTINUOUS PRN
Status: DISCONTINUED | OUTPATIENT
Start: 2021-08-04 | End: 2021-08-04

## 2021-08-04 RX ORDER — LIDOCAINE 40 MG/G
CREAM TOPICAL
Status: DISCONTINUED | OUTPATIENT
Start: 2021-08-04 | End: 2021-08-05 | Stop reason: HOSPADM

## 2021-08-04 RX ORDER — IBUPROFEN 600 MG/1
600 TABLET, FILM COATED ORAL EVERY 6 HOURS PRN
Qty: 100 TABLET | Refills: 1 | Status: SHIPPED | OUTPATIENT
Start: 2021-08-04 | End: 2022-08-04

## 2021-08-04 RX ORDER — ACETAMINOPHEN 325 MG/1
975 TABLET ORAL EVERY 4 HOURS PRN
Status: DISCONTINUED | OUTPATIENT
Start: 2021-08-04 | End: 2021-08-05 | Stop reason: HOSPADM

## 2021-08-04 RX ORDER — FENTANYL CITRATE 50 UG/ML
INJECTION, SOLUTION INTRAMUSCULAR; INTRAVENOUS PRN
Status: DISCONTINUED | OUTPATIENT
Start: 2021-08-04 | End: 2021-08-04

## 2021-08-04 RX ORDER — DEXAMETHASONE SODIUM PHOSPHATE 4 MG/ML
INJECTION, SOLUTION INTRA-ARTICULAR; INTRALESIONAL; INTRAMUSCULAR; INTRAVENOUS; SOFT TISSUE PRN
Status: DISCONTINUED | OUTPATIENT
Start: 2021-08-04 | End: 2021-08-04

## 2021-08-04 RX ORDER — SCOLOPAMINE TRANSDERMAL SYSTEM 1 MG/1
1 PATCH, EXTENDED RELEASE TRANSDERMAL ONCE
Status: DISCONTINUED | OUTPATIENT
Start: 2021-08-04 | End: 2021-08-05 | Stop reason: HOSPADM

## 2021-08-04 RX ORDER — ONDANSETRON 4 MG/1
4 TABLET, ORALLY DISINTEGRATING ORAL EVERY 30 MIN PRN
Status: DISCONTINUED | OUTPATIENT
Start: 2021-08-04 | End: 2021-08-05 | Stop reason: HOSPADM

## 2021-08-04 RX ORDER — KETAMINE HYDROCHLORIDE 10 MG/ML
INJECTION INTRAMUSCULAR; INTRAVENOUS PRN
Status: DISCONTINUED | OUTPATIENT
Start: 2021-08-04 | End: 2021-08-04

## 2021-08-04 RX ORDER — BUPIVACAINE HYDROCHLORIDE 2.5 MG/ML
INJECTION, SOLUTION INFILTRATION; PERINEURAL PRN
Status: DISCONTINUED | OUTPATIENT
Start: 2021-08-04 | End: 2021-08-04 | Stop reason: HOSPADM

## 2021-08-04 RX ORDER — ONDANSETRON 2 MG/ML
4 INJECTION INTRAMUSCULAR; INTRAVENOUS EVERY 30 MIN PRN
Status: DISCONTINUED | OUTPATIENT
Start: 2021-08-04 | End: 2021-08-05 | Stop reason: HOSPADM

## 2021-08-04 RX ORDER — OXYCODONE HYDROCHLORIDE 5 MG/1
5 TABLET ORAL
Status: COMPLETED | OUTPATIENT
Start: 2021-08-04 | End: 2021-08-04

## 2021-08-04 RX ORDER — LABETALOL 20 MG/4 ML (5 MG/ML) INTRAVENOUS SYRINGE
5
Status: SHIPPED | OUTPATIENT
Start: 2021-08-04 | End: 2021-08-04

## 2021-08-04 RX ORDER — MAGNESIUM SULFATE HEPTAHYDRATE 40 MG/ML
4 INJECTION, SOLUTION INTRAVENOUS ONCE
Status: COMPLETED | OUTPATIENT
Start: 2021-08-04 | End: 2021-08-04

## 2021-08-04 RX ORDER — OXYCODONE HYDROCHLORIDE 5 MG/1
5 TABLET ORAL EVERY 4 HOURS PRN
Status: DISCONTINUED | OUTPATIENT
Start: 2021-08-04 | End: 2021-08-05 | Stop reason: HOSPADM

## 2021-08-04 RX ORDER — ONDANSETRON 2 MG/ML
INJECTION INTRAMUSCULAR; INTRAVENOUS PRN
Status: DISCONTINUED | OUTPATIENT
Start: 2021-08-04 | End: 2021-08-04

## 2021-08-04 RX ORDER — OXYCODONE HYDROCHLORIDE 5 MG/1
5 TABLET ORAL EVERY 6 HOURS PRN
Qty: 12 TABLET | Refills: 0 | Status: SHIPPED | OUTPATIENT
Start: 2021-08-04 | End: 2021-08-07

## 2021-08-04 RX ORDER — HYDROMORPHONE HCL IN WATER/PF 6 MG/30 ML
0.2 PATIENT CONTROLLED ANALGESIA SYRINGE INTRAVENOUS EVERY 5 MIN PRN
Status: DISCONTINUED | OUTPATIENT
Start: 2021-08-04 | End: 2021-08-05 | Stop reason: HOSPADM

## 2021-08-04 RX ORDER — SODIUM CHLORIDE, SODIUM LACTATE, POTASSIUM CHLORIDE, CALCIUM CHLORIDE 600; 310; 30; 20 MG/100ML; MG/100ML; MG/100ML; MG/100ML
INJECTION, SOLUTION INTRAVENOUS CONTINUOUS
Status: DISCONTINUED | OUTPATIENT
Start: 2021-08-04 | End: 2021-08-05 | Stop reason: HOSPADM

## 2021-08-04 RX ORDER — ACETAMINOPHEN 325 MG/1
975 TABLET ORAL ONCE
Status: COMPLETED | OUTPATIENT
Start: 2021-08-04 | End: 2021-08-04

## 2021-08-04 RX ORDER — LIDOCAINE HYDROCHLORIDE 20 MG/ML
INJECTION, SOLUTION INFILTRATION; PERINEURAL PRN
Status: DISCONTINUED | OUTPATIENT
Start: 2021-08-04 | End: 2021-08-04

## 2021-08-04 RX ORDER — PROPOFOL 10 MG/ML
INJECTION, EMULSION INTRAVENOUS PRN
Status: DISCONTINUED | OUTPATIENT
Start: 2021-08-04 | End: 2021-08-04

## 2021-08-04 RX ORDER — GLYCOPYRROLATE 0.2 MG/ML
INJECTION, SOLUTION INTRAMUSCULAR; INTRAVENOUS PRN
Status: DISCONTINUED | OUTPATIENT
Start: 2021-08-04 | End: 2021-08-04

## 2021-08-04 RX ORDER — FENTANYL CITRATE 50 UG/ML
25 INJECTION, SOLUTION INTRAMUSCULAR; INTRAVENOUS EVERY 5 MIN PRN
Status: SHIPPED | OUTPATIENT
Start: 2021-08-04 | End: 2021-08-04

## 2021-08-04 RX ORDER — FENTANYL CITRATE 50 UG/ML
25 INJECTION, SOLUTION INTRAMUSCULAR; INTRAVENOUS EVERY 5 MIN PRN
Status: DISCONTINUED | OUTPATIENT
Start: 2021-08-04 | End: 2021-08-05 | Stop reason: HOSPADM

## 2021-08-04 RX ADMIN — MAGNESIUM SULFATE HEPTAHYDRATE 4 G: 40 INJECTION, SOLUTION INTRAVENOUS at 10:27

## 2021-08-04 RX ADMIN — KETAMINE HYDROCHLORIDE 50 MG: 10 INJECTION INTRAMUSCULAR; INTRAVENOUS at 10:35

## 2021-08-04 RX ADMIN — SODIUM CHLORIDE, SODIUM LACTATE, POTASSIUM CHLORIDE, CALCIUM CHLORIDE: 600; 310; 30; 20 INJECTION, SOLUTION INTRAVENOUS at 09:06

## 2021-08-04 RX ADMIN — Medication 10 MG: at 11:01

## 2021-08-04 RX ADMIN — ACETAMINOPHEN 975 MG: 325 TABLET ORAL at 09:05

## 2021-08-04 RX ADMIN — ONDANSETRON 4 MG: 2 INJECTION INTRAMUSCULAR; INTRAVENOUS at 10:55

## 2021-08-04 RX ADMIN — FENTANYL CITRATE 25 MCG: 50 INJECTION, SOLUTION INTRAMUSCULAR; INTRAVENOUS at 12:20

## 2021-08-04 RX ADMIN — OXYCODONE HYDROCHLORIDE 5 MG: 5 TABLET ORAL at 13:00

## 2021-08-04 RX ADMIN — MAGNESIUM SULFATE HEPTAHYDRATE 4 G: 40 INJECTION, SOLUTION INTRAVENOUS at 09:06

## 2021-08-04 RX ADMIN — LIDOCAINE HYDROCHLORIDE 60 ML: 20 INJECTION, SOLUTION INFILTRATION; PERINEURAL at 10:35

## 2021-08-04 RX ADMIN — FENTANYL CITRATE 50 MCG: 50 INJECTION, SOLUTION INTRAMUSCULAR; INTRAVENOUS at 10:56

## 2021-08-04 RX ADMIN — SODIUM CHLORIDE, SODIUM LACTATE, POTASSIUM CHLORIDE, CALCIUM CHLORIDE: 600; 310; 30; 20 INJECTION, SOLUTION INTRAVENOUS at 12:35

## 2021-08-04 RX ADMIN — FENTANYL CITRATE 50 MCG: 50 INJECTION, SOLUTION INTRAMUSCULAR; INTRAVENOUS at 10:35

## 2021-08-04 RX ADMIN — DEXAMETHASONE SODIUM PHOSPHATE 4 MG: 4 INJECTION, SOLUTION INTRA-ARTICULAR; INTRALESIONAL; INTRAMUSCULAR; INTRAVENOUS; SOFT TISSUE at 10:55

## 2021-08-04 RX ADMIN — PROPOFOL 150 MG: 10 INJECTION, EMULSION INTRAVENOUS at 10:35

## 2021-08-04 RX ADMIN — PROPOFOL 180 MCG/KG/MIN: 10 INJECTION, EMULSION INTRAVENOUS at 10:35

## 2021-08-04 RX ADMIN — Medication 30 MG: at 10:35

## 2021-08-04 RX ADMIN — SCOLOPAMINE TRANSDERMAL SYSTEM 1 PATCH: 1 PATCH, EXTENDED RELEASE TRANSDERMAL at 09:08

## 2021-08-04 RX ADMIN — KETOROLAC TROMETHAMINE 15 MG: 30 INJECTION, SOLUTION INTRAMUSCULAR; INTRAVENOUS at 11:38

## 2021-08-04 RX ADMIN — GLYCOPYRROLATE 0.2 MG: 0.2 INJECTION, SOLUTION INTRAMUSCULAR; INTRAVENOUS at 10:35

## 2021-08-04 ASSESSMENT — MIFFLIN-ST. JEOR: SCORE: 1692.81

## 2021-08-04 NOTE — DISCHARGE INSTRUCTIONS
You received 1,000 mg of acetaminophen (Tylenol) at 9:05 AM. Do not exceed 4,000 mg of acetaminophen during a 24 hour period and keep in mind that acetaminophen can also be found in many over-the-counter cold medications as well as narcotics that may be given for pain.     You received a medication called Toradol (a stronger IV ibuprofen) at 11:38 AM. Do NOT take any Ibuprofen / Advil / Aleve / Naproxen or products containing Ibuprofen until 5:38 PM or later.    Please remove Scopalamine patch, placed behind one of your ears prior to surgery, within 72 hours after your procedure. This patch is used to treat nausea. Wash your hands thoroughly after patch removal and do not touch eyes afterwards.    If you have any questions or concerns regarding your procedure, please contact Dr. Palumbo, their office number is [phone].

## 2021-08-04 NOTE — ANESTHESIA POSTPROCEDURE EVALUATION
Patient: Isabel Perrin    Procedure(s):  LAPAROSCOPIC LYSIS OF ADHESIONS    Diagnosis:Cyst of left ovary [N83.202]  Pelvic pain [R10.2]  Diagnosis Additional Information: No value filed.    Anesthesia Type:  General    Note:  Disposition: Outpatient   Postop Pain Control: Uneventful            Sign Out: Well controlled pain   PONV: No   Neuro/Psych: Uneventful            Sign Out: Acceptable/Baseline neuro status   Airway/Respiratory: Uneventful            Sign Out: Acceptable/Baseline resp. status   CV/Hemodynamics: Uneventful            Sign Out: Acceptable CV status; No obvious hypovolemia; No obvious fluid overload   Other NRE: NONE   DID A NON-ROUTINE EVENT OCCUR?            Last vitals:  Vitals Value Taken Time   /79 08/04/21 1241   Temp 96.9  F (36.1  C) 08/04/21 1157   Pulse 53 08/04/21 1241   Resp 16 08/04/21 1240   SpO2 96 % 08/04/21 1241   Vitals shown include unvalidated device data.    Electronically Signed By: Ruth Ann Veras MD  August 4, 2021  1:38 PM

## 2021-08-04 NOTE — OP NOTE
Gynecology Operative Note    Pre-operative diagnosis: Left Ovarian Cyst   Post-operative diagnosis: Pelvic Adhesions   Procedure: Laproscopy with Lysis of adhesions   Surgeon: Phil Palumbo MD   Assistant(s): Mariam Mishra   Anesthesia: General Endotracheal Anesthesia   Estimated blood loss: 10 mL   Total IV fluids: (See anesthesia record)   Blood transfusion: No transfusion was given during surgery   Total urine output: (See anesthesia record)   Drains: None   Specimens: None   Findings: Multiple pelvic adhesions. Left fallopian tube adherent to anterior abdominal wall. Omentum adherent to left ovary and fallopian tube. Omentum and bowel adherent to posterior uterine wall. Right fallopian tube adherent to right ovary.   Complications: None   Condition: Stable   Comments: See Operative Note below.       The patient was seen in the Preoperative Holding Room. The risks, benefits, complications, and expected outcomes were discussed with the patient.  The patient concurred with the proposed plan, giving informed consent. The patient was taken to the Operating Room, identified as Isabel Perrin and the procedure verified as Laparoscopic assisted vaginal hysterectomy. The site of surgery properly noted/marked. A Time Out was held and the above information confirmed.    After induction of anesthesia, the patient was placed in modified dorsal lithotomy position where she was prepped, draped, and catheterized in the normal, sterile fashion. The cervix was visualized and a single toothed tenaculum and uterine sound were placed.   A 5 mm umbilical incision was then performed. Veress needle was passed and pneumoperitoneum was established. A 5 mm port was placed under direct visualization. Two additional 5 mm ports were placed in the right and left lower quadrants for instrumentation.   A sweep of the abdomen and pelvis revealed multiple adhesions as describe above. The adhesion between the left fallopian tube and anterior  abdominal wall was taken down with the monopolar scissors. Once the left adnexa was free the left ovary was examined and no cyst was found. Omentum was adherent to left fallopian tube and left ovary. The right adnexa was examined and the right fallopian tube was densely adherent to the right ovary in multiple locations. Omentum and bowel were also adherent to the posterior uterine wall.  The ports were removed and the abdomen was desufflated. Skin incisions were closed and dressings were placed. Thapa catheter was removed. All instruments were removed from the vagina. Patient tolerated the procedure well without complication. All counts were correct X 2 at the end of the case. Patient was taken to recovery in stable condition.     Phil Palumbo MD

## 2021-08-04 NOTE — ANESTHESIA PROCEDURE NOTES
Airway       Patient location during procedure: OR       Procedure Start/Stop Times: 8/4/2021 10:41 AM  Staff -        Performed By: CRNA  Consent for Airway        Urgency: elective  Indications and Patient Condition       Indications for airway management: willow-procedural       Induction type:intravenous       Mask difficulty assessment: 2 - vent by mask + OA or adjuvant +/- NMBA    Final Airway Details       Final airway type: endotracheal airway       Successful airway: ETT - single  Endotracheal Airway Details        ETT size (mm): 7.0       Cuffed: yes       Cuff volume (mL): 8       Successful intubation technique: direct laryngoscopy       DL Blade Type: MAC 3       Grade View of Cords: 1       Adjucts: stylet       Position: Right       Measured from: gums/teeth       Secured at (cm): 21       Bite block used: None    Post intubation assessment        Placement verified by: capnometry, equal breath sounds and chest rise        Number of attempts at approach: 1       Secured with: silk tape       Ease of procedure: easy       Dentition: Intact and Unchanged

## 2021-08-04 NOTE — ANESTHESIA POSTPROCEDURE EVALUATION
Patient: Isabel Perrin    Procedure(s):  LAPAROSCOPIC LYSIS OF ADHESIONS    Diagnosis:Cyst of left ovary [N83.202]  Pelvic pain [R10.2]  Diagnosis Additional Information: No value filed.    Anesthesia Type:  General    Note:  Disposition: Outpatient   Postop Pain Control: Uneventful            Sign Out: Well controlled pain   PONV: No   Neuro/Psych: Uneventful            Sign Out: Acceptable/Baseline neuro status   Airway/Respiratory: Uneventful            Sign Out: Acceptable/Baseline resp. status   CV/Hemodynamics: Uneventful            Sign Out: Acceptable CV status; No obvious hypovolemia; No obvious fluid overload   Other NRE: NONE   DID A NON-ROUTINE EVENT OCCUR?            Last vitals:  Vitals Value Taken Time   /79 08/04/21 1241   Temp 96.9  F (36.1  C) 08/04/21 1157   Pulse 53 08/04/21 1241   Resp 16 08/04/21 1240   SpO2 96 % 08/04/21 1241   Vitals shown include unvalidated device data.    Electronically Signed By: Ruth Ann Veras MD  August 4, 2021  1:31 PM

## 2021-08-04 NOTE — ANESTHESIA PREPROCEDURE EVALUATION
Anesthesia Pre-Procedure Evaluation    Patient: Isabel Perrin   MRN: [id number] : 1980        Preoperative Diagnosis: Cyst of left ovary [N83.202]  Pelvic pain [R10.2]   Procedure : Procedure(s):  LAPAROSCOPIC LEFT OVARIAN CYSTECTOMY     Past Medical History:   Diagnosis Date     Blood dyscrasia      Diabetes (H)     had gestational diabetes     Disease of thyroid gland      Female infertility      Hypothyroid      MTHFR deficiency complicating pregnancy (H)       Past Surgical History:   Procedure Laterality Date      SECTION N/A 11/15/2018    Procedure: PRIMARY LOW TRANSVERSE  SECTION;  Surgeon: Denise Sparks MD;  Location: [facility];  Service: Obstetrics      SECTION N/A 3/11/2021    Procedure: REPEAT  SECTION;  Surgeon: Denise Sparks MD;  Location: [facility];  Service: Obstetrics     DAVINCI MYOMECTOMY  3/8/2012    Procedure:DAVINCI MYOMECTOMY; Davinci Myomectomy Converted to Open Mini-Laparotomy; Surgeon:RASHMI SERNA; [facility]     EXCISE BREAST CYST/FIBROADENOMA/TUMOR/DUCT LESION/NIPPLE LESION/AREOLAR LESION      right fibroadenoma     MYOMECTOMY  ,      SHOULDER SURGERY      left shoulder     SHOULDER SURGERY Left       No Known Allergies   Social History     Tobacco Use     Smoking status: Never Smoker     Smokeless tobacco: Never Used   Substance Use Topics     Alcohol use: No      Wt Readings from Last 1 Encounters:   21 105.1 kg (231 lb 11.2 oz)        Anesthesia Evaluation            ROS/MED HX  ENT/Pulmonary:  - neg pulmonary ROS     Neurologic:  - neg neurologic ROS     Cardiovascular:  - neg cardiovascular ROS     METS/Exercise Tolerance: >4 METS    Hematologic:  - neg hematologic  ROS     Musculoskeletal:  - neg musculoskeletal ROS     GI/Hepatic:  - neg GI/hepatic ROS     Renal/Genitourinary:  - neg Renal ROS     Endo:     (+) thyroid problem, Obesity,  Type II DM: Hx of GDM.   Psychiatric/Substance Use:  -  neg psychiatric ROS     Infectious Disease:  - neg infectious disease ROS     Malignancy:  - neg malignancy ROS     Other:     (-) Any chance pregnant       Physical Exam    Airway        Mallampati: II   TM distance: > 3 FB   Neck ROM: full     Respiratory Devices and Support         Dental  no notable dental history         Cardiovascular   cardiovascular exam normal          Pulmonary   pulmonary exam normal                OUTSIDE LABS:  CBC:   Lab Results   Component Value Date    WBC 5.1 08/03/2021    WBC 7.2 11/15/2018    HGB 13.9 08/03/2021    HGB 11.6 (L) 03/12/2021    HCT 42.2 08/03/2021    HCT 36.3 11/15/2018     08/03/2021     11/15/2018     BMP:   Lab Results   Component Value Date     08/03/2021    POTASSIUM 3.9 08/03/2021    POTASSIUM 4.1 03/09/2012    CHLORIDE 108 08/03/2021    CO2 25 08/03/2021    BUN 9 08/03/2021    CR 0.74 08/03/2021    CR 0.60 11/15/2018    GLC 98 08/03/2021     03/11/2021     COAGS:   Lab Results   Component Value Date    PTT 32 11/15/2018    INR 1.00 11/15/2018     POC:   Lab Results   Component Value Date     (H) 03/09/2012    HCG Negative 08/04/2021     HEPATIC:   Lab Results   Component Value Date    ALBUMIN 3.5 08/03/2021    PROTTOTAL 7.7 08/03/2021    ALT 18 08/03/2021    AST 9 08/03/2021    ALKPHOS 79 08/03/2021    BILITOTAL 0.8 08/03/2021     OTHER:   Lab Results   Component Value Date    A1C 5.1 07/13/2017    ALEX 8.5 08/03/2021    TSH 2.39 08/03/2021       Anesthesia Plan    ASA Status:  2   NPO Status:  NPO Appropriate    Anesthesia Type: General.     - Airway: ETT      Maintenance: TIVA.        Consents    Anesthesia Plan(s) and associated risks, benefits, and realistic alternatives discussed. Questions answered and patient/representative(s) expressed understanding.     - Discussed with:  Patient      - Patient is DNR/DNI Status: No         Postoperative Care    Pain management: IV analgesics, Oral pain medications, Multi-modal  analgesia.   PONV prophylaxis: Ondansetron (or other 5HT-3), Dexamethasone or Solumedrol, Scopolamine patch     Comments:                Ruth Ann Veras MD

## 2021-08-04 NOTE — ANESTHESIA CARE TRANSFER NOTE
Patient: Isabel Perrin    Procedure(s):  LAPAROSCOPIC LYSIS OF ADHESIONS    Diagnosis: Cyst of left ovary [N83.202]  Pelvic pain [R10.2]  Diagnosis Additional Information: No value filed.    Anesthesia Type:   General     Note:    Oropharynx: oropharynx clear of all foreign objects  Level of Consciousness: drowsy  Oxygen Supplementation: face mask  Level of Supplemental Oxygen (L/min / FiO2): 6  Independent Airway: airway patency satisfactory and stable  Dentition: dentition unchanged  Vital Signs Stable: post-procedure vital signs reviewed and stable  Report to RN Given: handoff report given  Patient transferred to: PACU    Handoff Report: Identifed the Patient, Identified the Reponsible Provider, Reviewed the pertinent medical history, Discussed the surgical course, Reviewed Intra-OP anesthesia mangement and issues during anesthesia, Set expectations for post-procedure period and Allowed opportunity for questions and acknowledgement of understanding      Vitals:  Vitals Value Taken Time   /72 08/04/21 1157   Temp 96.9  F (36.1  C) 08/04/21 1157   Pulse 69 08/04/21 1157   Resp 16 08/04/21 1157   SpO2 100 % 08/04/21 1157       Electronically Signed By: DENIS Sousa CRNA  August 4, 2021  12:00 PM

## 2021-08-19 ENCOUNTER — IMMUNIZATION (OUTPATIENT)
Dept: NURSING | Facility: CLINIC | Age: 41
End: 2021-08-19
Attending: FAMILY MEDICINE
Payer: COMMERCIAL

## 2021-08-19 PROCEDURE — 91300 PR COVID VAC PFIZER DIL RECON 30 MCG/0.3 ML IM: CPT

## 2021-08-19 PROCEDURE — 0002A PR COVID VAC PFIZER DIL RECON 30 MCG/0.3 ML IM: CPT

## 2021-09-19 ENCOUNTER — HEALTH MAINTENANCE LETTER (OUTPATIENT)
Age: 41
End: 2021-09-19

## 2022-03-06 ENCOUNTER — HEALTH MAINTENANCE LETTER (OUTPATIENT)
Age: 42
End: 2022-03-06

## 2022-07-28 ENCOUNTER — OFFICE VISIT (OUTPATIENT)
Dept: FAMILY MEDICINE | Facility: CLINIC | Age: 42
End: 2022-07-28
Payer: COMMERCIAL

## 2022-07-28 VITALS
OXYGEN SATURATION: 99 % | TEMPERATURE: 98.1 F | DIASTOLIC BLOOD PRESSURE: 69 MMHG | BODY MASS INDEX: 39.51 KG/M2 | HEIGHT: 63 IN | WEIGHT: 223 LBS | HEART RATE: 82 BPM | RESPIRATION RATE: 14 BRPM | SYSTOLIC BLOOD PRESSURE: 96 MMHG

## 2022-07-28 DIAGNOSIS — D25.9 UTERINE LEIOMYOMA, UNSPECIFIED LOCATION: ICD-10-CM

## 2022-07-28 DIAGNOSIS — Z01.818 PREOP GENERAL PHYSICAL EXAM: Primary | ICD-10-CM

## 2022-07-28 PROCEDURE — 99213 OFFICE O/P EST LOW 20 MIN: CPT | Performed by: PHYSICIAN ASSISTANT

## 2022-07-28 ASSESSMENT — PAIN SCALES - GENERAL: PAINLEVEL: NO PAIN (0)

## 2022-07-28 NOTE — H&P (VIEW-ONLY)
[facility]  [address]  [place] [address]  Phone: [phone]  Primary Provider: No Ref-Primary, Physician  Pre-op Performing Provider: TED WITT    PREOPERATIVE EVALUATION:  Today's date: 7/28/2022    Isabel Perrin is a 41 year old female who presents for a preoperative evaluation.    Surgical Information:  Surgery/Procedure: HYSTEROSCOPY, WITH DILATION AND CURETTAGE, ENDOMETRIAL POLYPECTOMY  Surgery Location: [facility]  Surgeon: DANIELLE Hilario  Surgery Date: 8/4/22  Time of Surgery: 7:45 am  Where patient plans to recover: At home with family  Fax number for surgical facility: Note does not need to be faxed, will be available electronically in Epic.    Type of Anesthesia Anticipated: sedation with MAC    Assessment & Plan     The proposed surgical procedure is considered INTERMEDIATE risk.    Problem List Items Addressed This Visit    None     Visit Diagnoses     Preop general physical exam    -  Primary    Screening for HIV (human immunodeficiency virus)        Need for hepatitis C screening test        Cervical cancer screening                   Risks and Recommendations:  The patient has the following additional risks and recommendations for perioperative complications:   - No identified additional risk factors other than previously addressed    Medication Instructions:   - ibuprofen (Advil, Motrin): HOLD 1 day before surgery.     RECOMMENDATION:  APPROVAL GIVEN to proceed with proposed procedure, without further diagnostic evaluation.    Subjective     HPI related to upcoming procedure: uterine fibroids affecting fertility. Needs polypectomy.    Preop Questions 7/28/2022   1. Have you ever had a heart attack or stroke? No   2. Have you ever had surgery on your heart or blood vessels, such as a stent placement, a coronary artery bypass, or surgery on an artery in your head, neck, heart, or legs? No   3. Do you have chest pain with activity? No   4. Do you  have a history of  heart failure? No   5. Do you currently have a cold, bronchitis or symptoms of other infection? No   6. Do you have a cough, shortness of breath, or wheezing? No   7. Do you or anyone in your family have previous history of blood clots? No   8. Do you or does anyone in your family have a serious bleeding problem such as prolonged bleeding following surgeries or cuts? No   9. Have you ever had problems with anemia or been told to take iron pills? No   10. Have you had any abnormal blood loss such as black, tarry or bloody stools, or abnormal vaginal bleeding? No   11. Have you ever had a blood transfusion? No   12. Are you willing to have a blood transfusion if it is medically needed before, during, or after your surgery? Yes   13. Have you or any of your relatives ever had problems with anesthesia? No   14. Do you have sleep apnea, excessive snoring or daytime drowsiness? No   15. Do you have any artifical heart valves or other implanted medical devices like a pacemaker, defibrillator, or continuous glucose monitor? No   16. Do you have artificial joints? No   17. Are you allergic to latex? No   18. Is there any chance that you may be pregnant? No       Health Care Directive:  Patient does not have a Health Care Directive or Living Will: Discussed advance care planning with patient; however, patient declined at this time.    Preoperative Review of :   reviewed - no record of controlled substances prescribed.    Status of Chronic Conditions:  HYPOTHYROIDISM - Patient has a longstanding history of chronic Hypothyroidism. Patient has been doing well, noting no tremor, insomnia, hair loss or changes in skin texture. Continues to take medications as directed, without adverse reactions or side effects. Last TSH   Lab Results   Component Value Date    TSH 2.39 08/03/2021   .        Review of Systems  CONSTITUTIONAL: NEGATIVE for fever, chills, change in weight  INTEGUMENTARY/SKIN: NEGATIVE for  worrisome rashes, moles or lesions  EYES: NEGATIVE for vision changes or irritation  ENT/MOUTH: NEGATIVE for ear, mouth and throat problems  RESP: NEGATIVE for significant cough or SOB  CV: NEGATIVE for chest pain, palpitations or peripheral edema  GI: NEGATIVE for nausea, abdominal pain, heartburn, or change in bowel habits  : NEGATIVE for frequency, dysuria, or hematuria  MUSCULOSKELETAL: NEGATIVE for significant arthralgias or myalgia  NEURO: NEGATIVE for weakness, dizziness or paresthesias  ENDOCRINE: NEGATIVE for temperature intolerance, skin/hair changes  HEME: NEGATIVE for bleeding problems  PSYCHIATRIC: NEGATIVE for changes in mood or affect    Patient Active Problem List    Diagnosis Date Noted     Pregnancy 2021     Priority: Medium     S/P repeat low transverse  2021     Priority: Medium     S/P  11/15/2018     Priority: Medium      Past Medical History:   Diagnosis Date     Blood dyscrasia      Diabetes (H)     had gestational diabetes     Disease of thyroid gland      Female infertility      Hypothyroid      MTHFR deficiency complicating pregnancy (H)      Obese      Past Surgical History:   Procedure Laterality Date      SECTION N/A 11/15/2018    Procedure: PRIMARY LOW TRANSVERSE  SECTION;  Surgeon: Denise Sparks MD;  Location: [facility];  Service: Obstetrics      SECTION N/A 3/11/2021    Procedure: REPEAT  SECTION;  Surgeon: Dneise Sparks MD;  Location: [facility];  Service: Obstetrics     DAVINCI MYOMECTOMY  3/8/2012    Procedure:DAVINCI MYOMECTOMY; Davinci Myomectomy Converted to Open Mini-Laparotomy; Surgeon:RASHMI SERNA; [facility]     EXCISE BREAST CYST/FIBROADENOMA/TUMOR/DUCT LESION/NIPPLE LESION/AREOLAR LESION      right fibroadenoma     LAPAROSCOPIC CYSTECTOMY OVARIAN (ONCOLOGY) Left 2021    Procedure: LAPAROSCOPIC LYSIS OF ADHESIONS;  Surgeon: Phil Palumbo MD;  Location: [facility]     MYOMECTOMY  2003, 2013     SHOULDER SURGERY      left shoulder     SHOULDER SURGERY Left      Current Outpatient Medications   Medication Sig Dispense Refill     ibuprofen (ADVIL/MOTRIN) 600 MG tablet Take 1 tablet (600 mg) by mouth every 6 hours as needed for moderate pain 100 tablet 1     levothyroxine (SYNTHROID/LEVOTHROID) 75 MCG tablet Take 75 mcg by mouth daily       Prenatal Vit-Fe Fumarate-FA (PRENATAL MULTIVITAMIN  WITH IRON) 28-0.8 MG TABS Take 1 tablet by mouth daily 100 each 3     VITAMIN D, CHOLECALCIFEROL, PO Take 5,000 Units by mouth daily         No Known Allergies     Social History     Tobacco Use     Smoking status: Never Smoker     Smokeless tobacco: Never Used   Substance Use Topics     Alcohol use: No     Family History   Problem Relation Age of Onset     Hypertension Mother      Breast Cancer No family hx of      Cancer - colorectal No family hx of      Diabetes No family hx of      C.A.D. No family hx of      Gynecology No family hx of         no history of fibroids     History   Drug Use No         Objective     BP 96/69   Pulse 82   Temp 98.1  F (36.7  C) (Tympanic)   Resp 14   Ht 1.596 m (5' 2.84\")   Wt 101.2 kg (223 lb)   LMP 07/14/2022 (Exact Date)   SpO2 99%   BMI 39.71 kg/m      Physical Exam    GENERAL APPEARANCE: healthy, alert and no distress     EYES: EOMI, PERRL     HENT: Nose and mouth without ulcers or lesions     NECK: no adenopathy, no asymmetry, masses, or scars and thyroid normal to palpation     RESP: lungs clear to auscultation - no rales, rhonchi or wheezes     CV: regular rates and rhythm, normal S1 S2, no S3 or S4 and no murmur, click or rub     ABDOMEN:  soft, nontender, no HSM or masses and bowel sounds normal     MS: extremities normal- no gross deformities noted, no evidence of inflammation in joints, FROM in all extremities.     SKIN: no suspicious lesions or rashes     NEURO: Normal strength and tone, sensory exam grossly normal, mentation " intact and speech normal     PSYCH: mentation appears normal. and affect normal/bright     LYMPHATICS: No cervical adenopathy    Recent Labs   Lab Test 08/03/21  0948 03/12/21  0623   HGB 13.9 11.6*     --      --    POTASSIUM 3.9  --    CR 0.74  --         Diagnostics:  No labs were ordered during this visit.   No EKG required, no history of coronary heart disease, significant arrhythmia, peripheral arterial disease or other structural heart disease.    Revised Cardiac Risk Index (RCRI):  The patient has the following serious cardiovascular risks for perioperative complications:   - No serious cardiac risks = 0 points     RCRI Interpretation: 0 points: Class I (very low risk - 0.4% complication rate)    Signed Electronically by: CHERISE Holt  Copy of this evaluation report is provided to requesting physician.

## 2022-07-28 NOTE — PROGRESS NOTES
[facility]  [address]  [place] [address]  Phone: [phone]  Primary Provider: No Ref-Primary, Physician  Pre-op Performing Provider: TED WITT    PREOPERATIVE EVALUATION:  Today's date: 7/28/2022    Isabel Perrin is a 41 year old female who presents for a preoperative evaluation.    Surgical Information:  Surgery/Procedure: HYSTEROSCOPY, WITH DILATION AND CURETTAGE, ENDOMETRIAL POLYPECTOMY  Surgery Location: [facility]  Surgeon: DANIELLE Hilario  Surgery Date: 8/4/22  Time of Surgery: 7:45 am  Where patient plans to recover: At home with family  Fax number for surgical facility: Note does not need to be faxed, will be available electronically in Epic.    Type of Anesthesia Anticipated: sedation with MAC    Assessment & Plan     The proposed surgical procedure is considered INTERMEDIATE risk.    Problem List Items Addressed This Visit    None     Visit Diagnoses     Preop general physical exam    -  Primary    Screening for HIV (human immunodeficiency virus)        Need for hepatitis C screening test        Cervical cancer screening                   Risks and Recommendations:  The patient has the following additional risks and recommendations for perioperative complications:   - No identified additional risk factors other than previously addressed    Medication Instructions:   - ibuprofen (Advil, Motrin): HOLD 1 day before surgery.     RECOMMENDATION:  APPROVAL GIVEN to proceed with proposed procedure, without further diagnostic evaluation.    Subjective     HPI related to upcoming procedure: uterine fibroids affecting fertility. Needs polypectomy.    Preop Questions 7/28/2022   1. Have you ever had a heart attack or stroke? No   2. Have you ever had surgery on your heart or blood vessels, such as a stent placement, a coronary artery bypass, or surgery on an artery in your head, neck, heart, or legs? No   3. Do you have chest pain with activity? No   4. Do you  have a history of  heart failure? No   5. Do you currently have a cold, bronchitis or symptoms of other infection? No   6. Do you have a cough, shortness of breath, or wheezing? No   7. Do you or anyone in your family have previous history of blood clots? No   8. Do you or does anyone in your family have a serious bleeding problem such as prolonged bleeding following surgeries or cuts? No   9. Have you ever had problems with anemia or been told to take iron pills? No   10. Have you had any abnormal blood loss such as black, tarry or bloody stools, or abnormal vaginal bleeding? No   11. Have you ever had a blood transfusion? No   12. Are you willing to have a blood transfusion if it is medically needed before, during, or after your surgery? Yes   13. Have you or any of your relatives ever had problems with anesthesia? No   14. Do you have sleep apnea, excessive snoring or daytime drowsiness? No   15. Do you have any artifical heart valves or other implanted medical devices like a pacemaker, defibrillator, or continuous glucose monitor? No   16. Do you have artificial joints? No   17. Are you allergic to latex? No   18. Is there any chance that you may be pregnant? No       Health Care Directive:  Patient does not have a Health Care Directive or Living Will: Discussed advance care planning with patient; however, patient declined at this time.    Preoperative Review of :   reviewed - no record of controlled substances prescribed.    Status of Chronic Conditions:  HYPOTHYROIDISM - Patient has a longstanding history of chronic Hypothyroidism. Patient has been doing well, noting no tremor, insomnia, hair loss or changes in skin texture. Continues to take medications as directed, without adverse reactions or side effects. Last TSH   Lab Results   Component Value Date    TSH 2.39 08/03/2021   .        Review of Systems  CONSTITUTIONAL: NEGATIVE for fever, chills, change in weight  INTEGUMENTARY/SKIN: NEGATIVE for  worrisome rashes, moles or lesions  EYES: NEGATIVE for vision changes or irritation  ENT/MOUTH: NEGATIVE for ear, mouth and throat problems  RESP: NEGATIVE for significant cough or SOB  CV: NEGATIVE for chest pain, palpitations or peripheral edema  GI: NEGATIVE for nausea, abdominal pain, heartburn, or change in bowel habits  : NEGATIVE for frequency, dysuria, or hematuria  MUSCULOSKELETAL: NEGATIVE for significant arthralgias or myalgia  NEURO: NEGATIVE for weakness, dizziness or paresthesias  ENDOCRINE: NEGATIVE for temperature intolerance, skin/hair changes  HEME: NEGATIVE for bleeding problems  PSYCHIATRIC: NEGATIVE for changes in mood or affect    Patient Active Problem List    Diagnosis Date Noted     Pregnancy 2021     Priority: Medium     S/P repeat low transverse  2021     Priority: Medium     S/P  11/15/2018     Priority: Medium      Past Medical History:   Diagnosis Date     Blood dyscrasia      Diabetes (H)     had gestational diabetes     Disease of thyroid gland      Female infertility      Hypothyroid      MTHFR deficiency complicating pregnancy (H)      Obese      Past Surgical History:   Procedure Laterality Date      SECTION N/A 11/15/2018    Procedure: PRIMARY LOW TRANSVERSE  SECTION;  Surgeon: Denise Sparks MD;  Location: [facility];  Service: Obstetrics      SECTION N/A 3/11/2021    Procedure: REPEAT  SECTION;  Surgeon: Denise Sparks MD;  Location: [facility];  Service: Obstetrics     DAVINCI MYOMECTOMY  3/8/2012    Procedure:DAVINCI MYOMECTOMY; Davinci Myomectomy Converted to Open Mini-Laparotomy; Surgeon:RASHMI SERNA; [facility]     EXCISE BREAST CYST/FIBROADENOMA/TUMOR/DUCT LESION/NIPPLE LESION/AREOLAR LESION      right fibroadenoma     LAPAROSCOPIC CYSTECTOMY OVARIAN (ONCOLOGY) Left 2021    Procedure: LAPAROSCOPIC LYSIS OF ADHESIONS;  Surgeon: Phil Palumbo MD;  Location: [facility]     MYOMECTOMY  2003, 2013     SHOULDER SURGERY      left shoulder     SHOULDER SURGERY Left      Current Outpatient Medications   Medication Sig Dispense Refill     ibuprofen (ADVIL/MOTRIN) 600 MG tablet Take 1 tablet (600 mg) by mouth every 6 hours as needed for moderate pain 100 tablet 1     levothyroxine (SYNTHROID/LEVOTHROID) 75 MCG tablet Take 75 mcg by mouth daily       Prenatal Vit-Fe Fumarate-FA (PRENATAL MULTIVITAMIN  WITH IRON) 28-0.8 MG TABS Take 1 tablet by mouth daily 100 each 3     VITAMIN D, CHOLECALCIFEROL, PO Take 5,000 Units by mouth daily         No Known Allergies     Social History     Tobacco Use     Smoking status: Never Smoker     Smokeless tobacco: Never Used   Substance Use Topics     Alcohol use: No     Family History   Problem Relation Age of Onset     Hypertension Mother      Breast Cancer No family hx of      Cancer - colorectal No family hx of      Diabetes No family hx of      C.A.D. No family hx of      Gynecology No family hx of         no history of fibroids     History   Drug Use No         Objective     BP 96/69   Pulse 82   Temp 98.1  F (36.7  C) (Tympanic)   Resp 14   Ht 1.596 m (5' 2.84\")   Wt 101.2 kg (223 lb)   LMP 07/14/2022 (Exact Date)   SpO2 99%   BMI 39.71 kg/m      Physical Exam    GENERAL APPEARANCE: healthy, alert and no distress     EYES: EOMI, PERRL     HENT: Nose and mouth without ulcers or lesions     NECK: no adenopathy, no asymmetry, masses, or scars and thyroid normal to palpation     RESP: lungs clear to auscultation - no rales, rhonchi or wheezes     CV: regular rates and rhythm, normal S1 S2, no S3 or S4 and no murmur, click or rub     ABDOMEN:  soft, nontender, no HSM or masses and bowel sounds normal     MS: extremities normal- no gross deformities noted, no evidence of inflammation in joints, FROM in all extremities.     SKIN: no suspicious lesions or rashes     NEURO: Normal strength and tone, sensory exam grossly normal, mentation " intact and speech normal     PSYCH: mentation appears normal. and affect normal/bright     LYMPHATICS: No cervical adenopathy    Recent Labs   Lab Test 08/03/21  0948 03/12/21  0623   HGB 13.9 11.6*     --      --    POTASSIUM 3.9  --    CR 0.74  --         Diagnostics:  No labs were ordered during this visit.   No EKG required, no history of coronary heart disease, significant arrhythmia, peripheral arterial disease or other structural heart disease.    Revised Cardiac Risk Index (RCRI):  The patient has the following serious cardiovascular risks for perioperative complications:   - No serious cardiac risks = 0 points     RCRI Interpretation: 0 points: Class I (very low risk - 0.4% complication rate)    Signed Electronically by: CHERISE Holt  Copy of this evaluation report is provided to requesting physician.

## 2022-07-28 NOTE — PATIENT INSTRUCTIONS
[place],    Thank you for allowing [facility] to manage your care.    No ibuprofen before the surgery    For your pain, please use Tylenol 650mg every 6 hours.     Max acetaminophen (Tylenol) 4,000mg/24 hours    If you have any questions or concerns, please feel free to call us at [phone]    Sincerely,    Dale Evans PA-C    Did you know?      You can schedule a video visit for follow-up appointments as well as future appointments for certain conditions.  Please see the below link.     [email or web address]    If you have not already done so,  I encourage you to sign up for [employer] ([email or web address]).  This will allow you to review your results, securely communicate with a provider, and schedule virtual visits as well.    Preparing for Your Surgery  Getting started  A nurse will call you to review your health history and instructions. They will give you an arrival time based on your scheduled surgery time. Please be ready to share:  Your doctor's clinic name and phone number  Your medical, surgical and anesthesia history  A list of allergies and sensitivities  A list of medicines, including herbal treatments and over-the-counter drugs  Whether the patient has a legal guardian (ask how to send us the papers in advance)  Please tell us if you're pregnant--or if there's any chance you might be pregnant. Some surgeries may injure a fetus (unborn baby), so they require a pregnancy test. Surgeries that are safe for a fetus don't always need a test, and you can choose whether to have one.   If you have a child who's having surgery, please ask for a copy of Preparing for Your Child's Surgery.    Preparing for surgery  Within 30 days of surgery: Have a pre-op exam (sometimes called an H&P, or History and Physical). This can be done at a clinic or pre-operative center.  If you're having a , you may not need this exam. Talk to your care  team.  At your pre-op exam, talk to your care team about all medicines you take. If you need to stop any medicines before surgery, ask when to start taking them again.  We do this for your safety. Many medicines can make you bleed too much during surgery. Some change how well surgery (anesthesia) drugs work.  Call your insurance company to let them know you're having surgery. (If you don't have insurance, call [phone].)  Call your clinic if there's any change in your health. This includes signs of a cold or flu (sore throat, runny nose, cough, rash, fever). It also includes a scrape or scratch near the surgery site.  If you have questions on the day of surgery, call your hospital or surgery center.  COVID testing  You may need to be tested for COVID-19 before having surgery. If so, we will give you instructions.  Eating and drinking guidelines  For your safety: Unless your surgeon tells you otherwise, follow the guidelines below.  Eat and drink as usual until 8 hours before surgery. After that, no food or milk.  Drink clear liquids until 2 hours before surgery. These are liquids you can see through, like water, Gatorade and Propel Water. You may also have black coffee and tea (no cream or milk).  Nothing by mouth within 2 hours of surgery. This includes gum, candy and breath mints.  If you drink alcohol: Stop drinking it the night before surgery.  If your care team tells you to take medicine on the morning of surgery, it's okay to take it with a sip of water.  Preventing infection  Shower or bathe the night before and morning of your surgery. Follow the instructions your clinic gave you. (If no instructions, use regular soap.)  Don't shave or clip hair near your surgery site. We'll remove the hair if needed.  Don't smoke or vape the morning of surgery. You may chew nicotine gum up to 2 hours before surgery. A nicotine patch is okay.  Note: Some surgeries require you to completely quit smoking and nicotine.  Check with your surgeon.  Your care team will make every effort to keep you safe from infection. We will:  Clean our hands often with soap and water (or an alcohol-based hand rub).  Clean the skin at your surgery site with a special soap that kills germs.  Give you a special gown to keep you warm. (Cold raises the risk of infection.)  Wear special hair covers, masks, gowns and gloves during surgery.  Give antibiotic medicine, if prescribed. Not all surgeries need antibiotics.  What to bring on the day of surgery  Photo ID and insurance card  Copy of your health care directive, if you have one  Glasses and hearing aides (bring cases)  You can't wear contacts during surgery  Inhaler and eye drops, if you use them (tell us about these when you arrive)  CPAP machine or breathing device, if you use them  A few personal items, if spending the night  If you have . . .  A pacemaker, ICD (cardiac defibrillator) or other implant: Bring the ID card.  An implanted stimulator: Bring the remote control.  A legal guardian: Bring a copy of the certified (court-stamped) guardianship papers.  Please remove any jewelry, including body piercings. Leave jewelry and other valuables at home.  If you're going home the day of surgery  You must have a responsible adult drive you home. They should stay with you overnight as well.  If you don't have someone to stay with you, and you aren't safe to go home alone, we may keep you overnight. Insurance often won't pay for this.  After surgery  If it's hard to control your pain or you need more pain medicine, please call your surgeon's office.  Questions?   If you have any questions for your care team, list them here: _________________________________________________________________________________________________________________________________________________________________________ ____________________________________ ____________________________________ ____________________________________  For  informational purposes only. Not to replace the advice of your health care provider. Copyright   2003, 2019 [facility]. All rights reserved. Clinically reviewed by Pham Fitch MD. [employer] [id number] - REV 07/21.

## 2022-08-03 ENCOUNTER — ANESTHESIA EVENT (OUTPATIENT)
Dept: SURGERY | Facility: AMBULATORY SURGERY CENTER | Age: 42
End: 2022-08-03
Payer: COMMERCIAL

## 2022-08-04 ENCOUNTER — HOSPITAL ENCOUNTER (OUTPATIENT)
Facility: AMBULATORY SURGERY CENTER | Age: 42
Discharge: HOME | End: 2022-08-04
Attending: OBSTETRICS & GYNECOLOGY
Payer: COMMERCIAL

## 2022-08-04 ENCOUNTER — ANESTHESIA (OUTPATIENT)
Dept: SURGERY | Facility: AMBULATORY SURGERY CENTER | Age: 42
End: 2022-08-04
Payer: COMMERCIAL

## 2022-08-04 VITALS
HEART RATE: 67 BPM | RESPIRATION RATE: 16 BRPM | TEMPERATURE: 97.7 F | OXYGEN SATURATION: 99 % | SYSTOLIC BLOOD PRESSURE: 107 MMHG | DIASTOLIC BLOOD PRESSURE: 59 MMHG

## 2022-08-04 DIAGNOSIS — N84.0 ENDOMETRIAL POLYP: ICD-10-CM

## 2022-08-04 DIAGNOSIS — Z98.890 STATUS POST HYSTEROSCOPY: Primary | ICD-10-CM

## 2022-08-04 LAB
HCG UR QL: NEGATIVE
INTERNAL QC OK POCT: NORMAL
POCT KIT EXPIRATION DATE: NORMAL
POCT KIT LOT NUMBER: NORMAL

## 2022-08-04 RX ORDER — PROPOFOL 10 MG/ML
INJECTION, EMULSION INTRAVENOUS CONTINUOUS PRN
Status: DISCONTINUED | OUTPATIENT
Start: 2022-08-04 | End: 2022-08-04

## 2022-08-04 RX ORDER — OXYCODONE HYDROCHLORIDE 5 MG/1
5 TABLET ORAL
Status: DISCONTINUED | OUTPATIENT
Start: 2022-08-04 | End: 2022-08-05 | Stop reason: HOSPADM

## 2022-08-04 RX ORDER — MEPERIDINE HYDROCHLORIDE 25 MG/ML
12.5 INJECTION INTRAMUSCULAR; INTRAVENOUS; SUBCUTANEOUS
Status: DISCONTINUED | OUTPATIENT
Start: 2022-08-04 | End: 2022-08-05 | Stop reason: HOSPADM

## 2022-08-04 RX ORDER — KETOROLAC TROMETHAMINE 30 MG/ML
INJECTION, SOLUTION INTRAMUSCULAR; INTRAVENOUS PRN
Status: DISCONTINUED | OUTPATIENT
Start: 2022-08-04 | End: 2022-08-04

## 2022-08-04 RX ORDER — SODIUM CHLORIDE, SODIUM LACTATE, POTASSIUM CHLORIDE, CALCIUM CHLORIDE 600; 310; 30; 20 MG/100ML; MG/100ML; MG/100ML; MG/100ML
INJECTION, SOLUTION INTRAVENOUS CONTINUOUS
Status: DISCONTINUED | OUTPATIENT
Start: 2022-08-04 | End: 2022-08-05 | Stop reason: HOSPADM

## 2022-08-04 RX ORDER — ONDANSETRON 2 MG/ML
INJECTION INTRAMUSCULAR; INTRAVENOUS PRN
Status: DISCONTINUED | OUTPATIENT
Start: 2022-08-04 | End: 2022-08-04

## 2022-08-04 RX ORDER — FENTANYL CITRATE 50 UG/ML
INJECTION, SOLUTION INTRAMUSCULAR; INTRAVENOUS PRN
Status: DISCONTINUED | OUTPATIENT
Start: 2022-08-04 | End: 2022-08-04

## 2022-08-04 RX ORDER — ACETAMINOPHEN 325 MG/1
975 TABLET ORAL ONCE
Status: DISCONTINUED | OUTPATIENT
Start: 2022-08-04 | End: 2022-08-05 | Stop reason: HOSPADM

## 2022-08-04 RX ORDER — OXYCODONE HYDROCHLORIDE 5 MG/1
5 TABLET ORAL EVERY 4 HOURS PRN
Status: DISCONTINUED | OUTPATIENT
Start: 2022-08-04 | End: 2022-08-05 | Stop reason: HOSPADM

## 2022-08-04 RX ORDER — ONDANSETRON 4 MG/1
4 TABLET, ORALLY DISINTEGRATING ORAL EVERY 30 MIN PRN
Status: DISCONTINUED | OUTPATIENT
Start: 2022-08-04 | End: 2022-08-05 | Stop reason: HOSPADM

## 2022-08-04 RX ORDER — PROPOFOL 10 MG/ML
INJECTION, EMULSION INTRAVENOUS PRN
Status: DISCONTINUED | OUTPATIENT
Start: 2022-08-04 | End: 2022-08-04

## 2022-08-04 RX ORDER — ONDANSETRON 2 MG/ML
4 INJECTION INTRAMUSCULAR; INTRAVENOUS EVERY 30 MIN PRN
Status: DISCONTINUED | OUTPATIENT
Start: 2022-08-04 | End: 2022-08-05 | Stop reason: HOSPADM

## 2022-08-04 RX ORDER — HYDROMORPHONE HCL IN WATER/PF 6 MG/30 ML
0.2 PATIENT CONTROLLED ANALGESIA SYRINGE INTRAVENOUS EVERY 5 MIN PRN
Status: CANCELLED | OUTPATIENT
Start: 2022-08-04

## 2022-08-04 RX ORDER — FENTANYL CITRATE 0.05 MG/ML
25 INJECTION, SOLUTION INTRAMUSCULAR; INTRAVENOUS
Status: DISCONTINUED | OUTPATIENT
Start: 2022-08-04 | End: 2022-08-05 | Stop reason: HOSPADM

## 2022-08-04 RX ORDER — GLYCOPYRROLATE 0.2 MG/ML
INJECTION, SOLUTION INTRAMUSCULAR; INTRAVENOUS PRN
Status: DISCONTINUED | OUTPATIENT
Start: 2022-08-04 | End: 2022-08-04

## 2022-08-04 RX ORDER — LIDOCAINE HYDROCHLORIDE 20 MG/ML
INJECTION, SOLUTION INFILTRATION; PERINEURAL PRN
Status: DISCONTINUED | OUTPATIENT
Start: 2022-08-04 | End: 2022-08-04

## 2022-08-04 RX ORDER — FENTANYL CITRATE 0.05 MG/ML
25 INJECTION, SOLUTION INTRAMUSCULAR; INTRAVENOUS EVERY 5 MIN PRN
Status: CANCELLED | OUTPATIENT
Start: 2022-08-04

## 2022-08-04 RX ORDER — IBUPROFEN 800 MG/1
800 TABLET, FILM COATED ORAL ONCE
Status: DISCONTINUED | OUTPATIENT
Start: 2022-08-04 | End: 2022-08-05 | Stop reason: HOSPADM

## 2022-08-04 RX ORDER — LIDOCAINE 40 MG/G
CREAM TOPICAL
Status: DISCONTINUED | OUTPATIENT
Start: 2022-08-04 | End: 2022-08-05 | Stop reason: HOSPADM

## 2022-08-04 RX ORDER — ACETAMINOPHEN 325 MG/1
325-650 TABLET ORAL EVERY 6 HOURS PRN
COMMUNITY

## 2022-08-04 RX ORDER — IBUPROFEN 800 MG/1
800 TABLET, FILM COATED ORAL EVERY 6 HOURS PRN
Qty: 30 TABLET | Refills: 0 | Status: SHIPPED | OUTPATIENT
Start: 2022-08-04

## 2022-08-04 RX ORDER — DEXAMETHASONE SODIUM PHOSPHATE 4 MG/ML
INJECTION, SOLUTION INTRA-ARTICULAR; INTRALESIONAL; INTRAMUSCULAR; INTRAVENOUS; SOFT TISSUE PRN
Status: DISCONTINUED | OUTPATIENT
Start: 2022-08-04 | End: 2022-08-04

## 2022-08-04 RX ADMIN — PROPOFOL 160 MCG/KG/MIN: 10 INJECTION, EMULSION INTRAVENOUS at 07:50

## 2022-08-04 RX ADMIN — LIDOCAINE HYDROCHLORIDE 60 MG: 20 INJECTION, SOLUTION INFILTRATION; PERINEURAL at 07:50

## 2022-08-04 RX ADMIN — OXYCODONE HYDROCHLORIDE 5 MG: 5 TABLET ORAL at 08:56

## 2022-08-04 RX ADMIN — DEXAMETHASONE SODIUM PHOSPHATE 4 MG: 4 INJECTION, SOLUTION INTRA-ARTICULAR; INTRALESIONAL; INTRAMUSCULAR; INTRAVENOUS; SOFT TISSUE at 08:04

## 2022-08-04 RX ADMIN — PROPOFOL 20 MG: 10 INJECTION, EMULSION INTRAVENOUS at 07:52

## 2022-08-04 RX ADMIN — KETOROLAC TROMETHAMINE 15 MG: 30 INJECTION, SOLUTION INTRAMUSCULAR; INTRAVENOUS at 08:13

## 2022-08-04 RX ADMIN — PROPOFOL 20 MG: 10 INJECTION, EMULSION INTRAVENOUS at 07:54

## 2022-08-04 RX ADMIN — FENTANYL CITRATE 50 MCG: 50 INJECTION, SOLUTION INTRAMUSCULAR; INTRAVENOUS at 07:54

## 2022-08-04 RX ADMIN — ONDANSETRON 4 MG: 2 INJECTION INTRAMUSCULAR; INTRAVENOUS at 07:50

## 2022-08-04 RX ADMIN — SODIUM CHLORIDE, SODIUM LACTATE, POTASSIUM CHLORIDE, CALCIUM CHLORIDE: 600; 310; 30; 20 INJECTION, SOLUTION INTRAVENOUS at 07:12

## 2022-08-04 RX ADMIN — GLYCOPYRROLATE 0.2 MG: 0.2 INJECTION, SOLUTION INTRAMUSCULAR; INTRAVENOUS at 08:00

## 2022-08-04 NOTE — INTERVAL H&P NOTE
"I have reviewed the surgical (or preoperative) H&P that is linked to this encounter, and examined the patient. There are no significant changes    Clinical Conditions Present on Arrival:  Clinically Significant Risk Factors Present on Admission                 # Obesity: Estimated body mass index is 39.71 kg/m  as calculated from the following:    Height as of 7/28/22: 1.596 m (5' 2.84\").    Weight as of 7/28/22: 101.2 kg (223 lb).       Tanya Hilario, DO  [facility]  [phone]    "

## 2022-08-04 NOTE — ANESTHESIA POSTPROCEDURE EVALUATION
Patient: Isabel Perrin    Procedure: Procedure(s):  HYSTEROSCOPY, WITH DILATION AND CURETTAGE, ENDOMETRIAL POLYPECTOMY       Anesthesia Type:  MAC    Note:  Disposition: Outpatient   Postop Pain Control: Uneventful            Sign Out: Well controlled pain   PONV: No   Neuro/Psych: Uneventful            Sign Out: Acceptable/Baseline neuro status   Airway/Respiratory: Uneventful            Sign Out: Acceptable/Baseline resp. status   CV/Hemodynamics: Uneventful            Sign Out: Acceptable CV status; No obvious hypovolemia; No obvious fluid overload   Other NRE: NONE   DID A NON-ROUTINE EVENT OCCUR? No           Last vitals:  Vitals Value Taken Time   /59 08/04/22 0901   Temp 97.7  F (36.5  C) 08/04/22 0820   Pulse 60 08/04/22 0901   Resp 16 08/04/22 0900   SpO2 99 % 08/04/22 0901   Vitals shown include unvalidated device data.    Electronically Signed By: Deirdre Barrera MD  August 4, 2022  9:11 AM

## 2022-08-04 NOTE — OP NOTE
Hysteroscopy, D&C    Name: Isabel Perrin   MRN: [id number]   DATE OF SERVICE:  8/4/2022     PREOPERATIVE DIAGNOSIS: uterine polyp    POSTOPERATIVE DIAGNOSIS: Same    PROCEDURES: Hysteroscopy, dilatation and curettage, myosure polypectomy    SURGEON: Tanya Hilario DO     ASSISTANT: none      ANESTHESIA:  mac      ESTIMATED BLOOD LOSS:   5 cc      HISTORY OF PRESENT ILLNESS:  This is a 41 year old year old female with history of uterine polyp. She had a transvaginal ultrasound with saline which showed suspected endometrial polyp. She was given informed consent for the above procedure. The risks, benefits and alternatives were discussed with her. She expressed understanding and wished to proceed.       PROCEDURE:  The patient was brought to the operating room and after induction of general anesthesia was prepped and draped in the dorsal lithotomy position. A time out was called and the patient and the procedure were verified.      A sterile speculum was placed. The anterior lip of the cervix were grasped with single tooth tenaculum.  The cervix was gently dilated using Nai dilators and the hysteroscope was introduced. Cavity of the uterus was noted to have a polyp like mass on the right lateral wall of the uterine cavity. No other abnormalities were noted and bilateral tubal ostia were visualized.The myosure reach device was used to remove the polyp like mass and the tissue was submitted for pathologic exam. At this point good hemostasis was noted with this portion of the procedure. Instruments were removed from vagina. No active bleeding was noted. Sponge and needle counts were correct X 2. She was taken to recovery in stable condition.      Tanya Hilario DO  [facility]  [phone]

## 2022-08-04 NOTE — DISCHARGE INSTRUCTIONS
You received a dose of IV Toradol at 8:15am. Please do not take any additional Ibuprofen products (Aleve/Advil/Motrin/Naproxen/Celebrex) until after 2:15pm.

## 2022-08-04 NOTE — ANESTHESIA PREPROCEDURE EVALUATION
Anesthesia Pre-Procedure Evaluation    Patient: Isabel Perrin   MRN: [id number] : 1980        Procedure : Procedure(s):  HYSTEROSCOPY, WITH DILATION AND CURETTAGE, ENDOMETRIAL POLYPECTOMY          Past Medical History:   Diagnosis Date     Blood dyscrasia      Diabetes (H)     had gestational diabetes     Disease of thyroid gland      Female infertility      Hypothyroid      MTHFR deficiency complicating pregnancy (H)      Obese       Past Surgical History:   Procedure Laterality Date      SECTION N/A 11/15/2018    Procedure: PRIMARY LOW TRANSVERSE  SECTION;  Surgeon: Denise Sparks MD;  Location: [facility];  Service: Obstetrics      SECTION N/A 3/11/2021    Procedure: REPEAT  SECTION;  Surgeon: Denise Sparks MD;  Location: [facility];  Service: Obstetrics     DAVINCI MYOMECTOMY  3/8/2012    Procedure:DAVINCI MYOMECTOMY; Davinci Myomectomy Converted to Open Mini-Laparotomy; Surgeon:RASHMI SERNA; Location:[facility]     EXCISE BREAST CYST/FIBROADENOMA/TUMOR/DUCT LESION/NIPPLE LESION/AREOLAR LESION      right fibroadenoma     LAPAROSCOPIC CYSTECTOMY OVARIAN (ONCOLOGY) Left 2021    Procedure: LAPAROSCOPIC LYSIS OF ADHESIONS;  Surgeon: Phil Palumbo MD;  Location: [facility] OR     MYOMECTOMY  ,      SHOULDER SURGERY      left shoulder     SHOULDER SURGERY Left       No Known Allergies   Social History     Tobacco Use     Smoking status: Never Smoker     Smokeless tobacco: Never Used   Substance Use Topics     Alcohol use: No      Wt Readings from Last 1 Encounters:   22 101.2 kg (223 lb)        Anesthesia Evaluation   Pt has had prior anesthetic. Type: General and Regional.    No history of anesthetic complications       ROS/MED HX  ENT/Pulmonary:  - neg pulmonary ROS     Neurologic:  - neg neurologic ROS     Cardiovascular:  - neg cardiovascular ROS     METS/Exercise Tolerance: >4 METS    Hematologic:  - neg hematologic  ROS      Musculoskeletal:  - neg musculoskeletal ROS     GI/Hepatic:  - neg GI/hepatic ROS  (-) GERD and liver disease   Renal/Genitourinary:  - neg Renal ROS  (-) renal disease   Endo: Comment: GDM    (+) thyroid problem, hypothyroidism, Obesity,  (-) Type I DM   Psychiatric/Substance Use:  - neg psychiatric ROS     Infectious Disease: Comment: HPV      Malignancy:  - neg malignancy ROS     Other:  - neg other ROS   (-) Any chance pregnant       Physical Exam    Airway  airway exam normal      Mallampati: II   TM distance: > 3 FB   Neck ROM: full   Mouth opening: > 3 cm    Respiratory Devices and Support         Dental  no notable dental history         Cardiovascular   cardiovascular exam normal       Rhythm and rate: regular and normal     Pulmonary   pulmonary exam normal        breath sounds clear to auscultation           OUTSIDE LABS:  CBC:   Lab Results   Component Value Date    WBC 5.1 08/03/2021    WBC 7.2 11/15/2018    HGB 13.9 08/03/2021    HGB 11.6 (L) 03/12/2021    HCT 42.2 08/03/2021    HCT 36.3 11/15/2018     08/03/2021     11/15/2018     BMP:   Lab Results   Component Value Date     08/03/2021    POTASSIUM 3.9 08/03/2021    POTASSIUM 4.1 03/09/2012    CHLORIDE 108 08/03/2021    CO2 25 08/03/2021    BUN 9 08/03/2021    CR 0.74 08/03/2021    CR 0.60 11/15/2018     (A) 08/04/2021    GLC 98 08/03/2021     COAGS:   Lab Results   Component Value Date    PTT 32 11/15/2018    INR 1.00 11/15/2018     POC:   Lab Results   Component Value Date     (H) 03/09/2012    HCG Negative 08/04/2021     HEPATIC:   Lab Results   Component Value Date    ALBUMIN 3.5 08/03/2021    PROTTOTAL 7.7 08/03/2021    ALT 18 08/03/2021    AST 9 08/03/2021    ALKPHOS 79 08/03/2021    BILITOTAL 0.8 08/03/2021     OTHER:   Lab Results   Component Value Date    A1C 5.1 07/13/2017    ALEX 8.5 08/03/2021    TSH 2.39 08/03/2021       Anesthesia Plan    ASA Status:  2      Anesthesia Type: MAC.               Consents    Anesthesia Plan(s) and associated risks, benefits, and realistic alternatives discussed. Questions answered and patient/representative(s) expressed understanding.     - Discussed: Risks, Benefits and Alternatives for BOTH SEDATION and the PROCEDURE were discussed     - Discussed with:  Patient, Spouse      - Extended Intubation/Ventilatory Support Discussed: No.      - Patient is DNR/DNI Status: No    Use of blood products discussed: No .     Postoperative Care    Pain management: Oral pain medications, IV analgesics.   PONV prophylaxis: Ondansetron (or other 5HT-3), Dexamethasone or Solumedrol     Comments:                Deirdre Barrera MD

## 2022-08-04 NOTE — ANESTHESIA CARE TRANSFER NOTE
Patient: Isabel Perrin    Procedure: Procedure(s):  HYSTEROSCOPY, WITH DILATION AND CURETTAGE, ENDOMETRIAL POLYPECTOMY       Diagnosis: Endometrial polyp [N84.0]  Diagnosis Additional Information: No value filed.    Anesthesia Type:   MAC     Note:    Oropharynx: oropharynx clear of all foreign objects  Level of Consciousness: drowsy  Oxygen Supplementation: room air    Independent Airway: airway patency satisfactory and stable  Dentition: dentition unchanged  Vital Signs Stable: post-procedure vital signs reviewed and stable  Report to RN Given: handoff report given  Patient transferred to: Phase II    Handoff Report: Identifed the Patient, Identified the Reponsible Provider, Reviewed the pertinent medical history, Discussed the surgical course, Reviewed Intra-OP anesthesia mangement and issues during anesthesia, Set expectations for post-procedure period and Allowed opportunity for questions and acknowledgement of understanding      Vitals:  Vitals Value Taken Time   /64    Temp     Pulse 86 08/04/22 0818   Resp 16    SpO2 100 % 08/04/22 0818   Vitals shown include unvalidated device data.    Electronically Signed By: DENIS Johnson CRNA  August 4, 2022  8:22 AM

## 2022-11-21 ENCOUNTER — HEALTH MAINTENANCE LETTER (OUTPATIENT)
Age: 42
End: 2022-11-21

## 2023-04-16 ENCOUNTER — HEALTH MAINTENANCE LETTER (OUTPATIENT)
Age: 43
End: 2023-04-16

## 2023-08-23 ENCOUNTER — OFFICE VISIT (OUTPATIENT)
Dept: PODIATRY | Facility: CLINIC | Age: 43
End: 2023-08-23
Payer: COMMERCIAL

## 2023-08-23 VITALS
DIASTOLIC BLOOD PRESSURE: 85 MMHG | HEART RATE: 84 BPM | BODY MASS INDEX: 38.28 KG/M2 | SYSTOLIC BLOOD PRESSURE: 117 MMHG | HEIGHT: 64 IN

## 2023-08-23 DIAGNOSIS — M76.62 ACHILLES TENDINITIS OF LEFT LOWER EXTREMITY: Primary | ICD-10-CM

## 2023-08-23 PROBLEM — Z86.32 HISTORY OF GESTATIONAL DIABETES MELLITUS: Status: ACTIVE | Noted: 2021-12-28

## 2023-08-23 PROBLEM — R31.9 HEMATURIA: Status: ACTIVE | Noted: 2023-08-23

## 2023-08-23 PROBLEM — E72.10 DISORDER OF SULFUR-BEARING AMINO ACID METABOLISM (H): Status: ACTIVE | Noted: 2021-12-28

## 2023-08-23 PROBLEM — D68.59 OTHER PRIMARY THROMBOPHILIA (H): Status: ACTIVE | Noted: 2023-08-23

## 2023-08-23 PROBLEM — K21.9 GASTROESOPHAGEAL REFLUX DISEASE: Status: ACTIVE | Noted: 2021-12-28

## 2023-08-23 PROBLEM — O09.529 ELDERLY MULTIGRAVIDA: Status: ACTIVE | Noted: 2023-08-23

## 2023-08-23 PROBLEM — E66.812 CLASS 2 OBESITY: Status: ACTIVE | Noted: 2021-12-28

## 2023-08-23 PROBLEM — R06.00 DYSPNEA: Status: ACTIVE | Noted: 2019-11-18

## 2023-08-23 PROBLEM — O09.511 ELDERLY PRIMIGRAVIDA IN FIRST TRIMESTER: Status: ACTIVE | Noted: 2023-08-23

## 2023-08-23 PROBLEM — E66.9 OBESITY: Status: ACTIVE | Noted: 2023-08-23

## 2023-08-23 PROBLEM — K59.00 CONSTIPATION: Status: ACTIVE | Noted: 2023-08-23

## 2023-08-23 PROBLEM — N97.0 FEMALE INFERTILITY ASSOCIATED WITH ANOVULATION: Status: ACTIVE | Noted: 2023-08-23

## 2023-08-23 PROBLEM — R91.1 PULMONARY NODULE: Status: ACTIVE | Noted: 2019-11-18

## 2023-08-23 PROBLEM — N93.9 ABNORMAL UTERINE BLEEDING: Status: ACTIVE | Noted: 2021-12-28

## 2023-08-23 PROBLEM — Z79.01 LONG TERM CURRENT USE OF ANTICOAGULANT THERAPY: Status: ACTIVE | Noted: 2023-08-23

## 2023-08-23 PROBLEM — N92.6 IRREGULAR PERIODS/MENSTRUAL CYCLES: Status: ACTIVE | Noted: 2021-12-28

## 2023-08-23 PROBLEM — R30.0 DYSURIA: Status: ACTIVE | Noted: 2023-08-23

## 2023-08-23 PROCEDURE — 99204 OFFICE O/P NEW MOD 45 MIN: CPT | Performed by: PODIATRIST

## 2023-08-23 RX ORDER — CHORIOGONADOTROPIN ALFA 250 UG/.5ML
INJECTION, SOLUTION SUBCUTANEOUS
COMMUNITY
Start: 2023-03-09

## 2023-08-23 RX ORDER — LEVOTHYROXINE SODIUM 88 UG/1
88 TABLET ORAL DAILY
COMMUNITY
Start: 2023-07-27

## 2023-08-23 RX ORDER — CYCLOBENZAPRINE HCL 10 MG
10 TABLET ORAL
COMMUNITY
Start: 2023-07-28

## 2023-08-23 RX ORDER — LETROZOLE 2.5 MG/1
2.5 TABLET, FILM COATED ORAL
COMMUNITY
Start: 2023-06-19

## 2023-08-23 RX ORDER — MELOXICAM 15 MG/1
15 TABLET ORAL DAILY
Qty: 30 TABLET | Refills: 0 | Status: SHIPPED | OUTPATIENT
Start: 2023-08-23

## 2023-08-23 NOTE — PROGRESS NOTES
Pt is seen today as a new pt referral with the c/c of painful left foot.  This has been symptomatic for the past 2 years.  Pt denies any hx of trauma.  Aggravated by activity and relieved by rest.  Describes as burning pain.  Is slowly getting worse.  Going up stairs is painful.  Points to posterior heel.    ROS:  see above       No Known Allergies    Current Outpatient Medications   Medication Sig Dispense Refill    acetaminophen (TYLENOL) 325 MG tablet Take 325-650 mg by mouth every 6 hours as needed for mild pain      ibuprofen (ADVIL/MOTRIN) 800 MG tablet Take 1 tablet (800 mg) by mouth every 6 hours as needed for other (mild and/or inflammatory pain) 30 tablet 0    levothyroxine (SYNTHROID/LEVOTHROID) 88 MCG tablet 88 mcg daily      meloxicam (MOBIC) 15 MG tablet Take 1 tablet (15 mg) by mouth daily 30 tablet 0    Prenatal Vit-Fe Fumarate-FA (PRENATAL MULTIVITAMIN  WITH IRON) 28-0.8 MG TABS Take 1 tablet by mouth daily 100 each 3    VITAMIN D, CHOLECALCIFEROL, PO Take 5,000 Units by mouth daily      cyclobenzaprine (FLEXERIL) 10 MG tablet Take 10 mg by mouth      letrozole (FEMARA) 2.5 MG tablet Take 2.5 mg by mouth      levothyroxine (SYNTHROID/LEVOTHROID) 75 MCG tablet Take 75 mcg by mouth daily      OVIDREL 250 MCG/0.5ML syringe SC INJ INJECT 1 DOSE AS DIRECTED         Patient Active Problem List   Diagnosis    S/P     Pregnancy    S/P repeat low transverse     Pulmonary nodule    Other primary thrombophilia (H)    Class 2 obesity    Obesity    Long term current use of anticoagulant therapy    Joint derangement of shoulder region    Irregular periods/menstrual cycles    Hypothyroidism    History of gestational diabetes mellitus    History of being immune to rubella    Hematuria    Gastroesophageal reflux disease    Fibroids    Female infertility associated with anovulation    Elderly primigravida in first trimester    Elderly multigravida    Dysuria    Dyspnea    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)    Constipation    Breast disorder    Abnormal uterine bleeding       Past Medical History:   Diagnosis Date    Blood dyscrasia     Diabetes (H)     had gestational diabetes    Disease of thyroid gland     Female infertility     Gastroesophageal reflux disease 2021    Hypothyroid     MTHFR deficiency complicating pregnancy (H)     Obese        Past Surgical History:   Procedure Laterality Date     SECTION N/A 11/15/2018    Procedure: PRIMARY LOW TRANSVERSE  SECTION;  Surgeon: Denise Sparks MD;  Location[facility];  Service: Obstetrics     SECTION N/A 3/11/2021    Procedure: REPEAT  SECTION;  Surgeon: Denise Sparks MD;  Location: [facility];  Service: Obstetrics    DAVINCI MYOMECTOMY  3/8/2012    Procedure:DAVINCI MYOMECTOMY; Davinci Myomectomy Converted to Open Mini-Laparotomy; Surgeon:RASHMI SERNA; Location[facility]    DILATION AND CURETTAGE, OPERATIVE HYSTEROSCOPY, COMBINED N/A 2022    Procedure: HYSTEROSCOPY, WITH DILATION AND CURETTAGE, ENDOMETRIAL POLYPECTOMY;  Surgeon: Tanya Hilario DO;  Location: [facility] OR    EXCISE BREAST CYST/FIBROADENOMA/TUMOR/DUCT LESION/NIPPLE LESION/AREOLAR LESION      right fibroadenoma    LAPAROSCOPIC CYSTECTOMY OVARIAN (ONCOLOGY) Left 2021    Procedure: LAPAROSCOPIC LYSIS OF ADHESIONS;  Surgeon: Phil Palumbo MD;  Location: [facility] OR    MYOMECTOMY  ,     SHOULDER SURGERY      left shoulder    SHOULDER SURGERY Left        Family History   Problem Relation Age of Onset    Hypertension Mother     Breast Cancer No family hx of     Cancer - colorectal No family hx of     Diabetes No family hx of     C.A.D. No family hx of     Gynecology No family hx of         no history of fibroids       Social History     Tobacco Use    Smoking status: Never    Smokeless tobacco: Never   Substance Use Topics    Alcohol use: No         Exam:    Vitals:  "/85   Pulse 84   Ht 1.626 m (5' 4\")   BMI 38.28 kg/m    BMI: Body mass index is 38.28 kg/m .  Height: 5' 4\"    Constitutional/ general:  Pt is in no apparent distress, appears well-nourished.  Cooperative with history and physical exam.     Psych:  The patient answered questions appropriately.  Normal affect.  Seems to have reasonable expectations, in terms of treatment.     Lungs:  Non labored breathing, non labored speech. No cough.  No audible wheezing. Even, quiet breathing.       Vascular:  positive pedal pulses bilaterally for both the DP and PT arteries.  CFT < 3 sec.  negative ankle edema.  positive pedal hair growth.    Neuro:  Alert and oriented x 3. Coordinated gait.  Light touch sensation is intact     Derm: Normal texture and turgor.  No erythema, ecchymosis, or cyanosis.      Musculoskeletal:    Patient is ambulatory without an assistive device or brace.   Normal arch with weightbearing bilateral.  No forefoot or rear foot deformities noted.  MS 5/5 all compartments.  Normal ROM all fore foot and rearfoot joints with no pain or crepitus.  No equinus.  Pain on posterior  calcaneus left foot mostly medial and posterior.  Slight lateral Achilles insertion pain.  Negative bursal pain.  Slight edema.  No masses.  No ecchymosis.       A:  Insertional Achilles Tendonitis left foot     Plan:   Discussed etiology and treatment options in detail w/ the pt. patient is barefoot around the house.  She is wearing a malleable slip on shoe today.  Patient to wear good shoes at all times both inside and outside and I made suggestions.  Ice twice daily for 20 minutes.  She will massage this..  Discussed physical therapy.  She will call if she would like to do this.  Prescription written for meloxicam.  She will stop if any GI distress.  Avoid activities that aggravated this.  Return to clinic in 4 weeks if not better otherwise as needed.    Jason Soriano DPM, FACFAS         "

## 2023-08-23 NOTE — PATIENT INSTRUCTIONS
We wish you continued good healing. If you have any questions or concerns, please do not hesitate to contact us at  [phone]    [employer] (secure e-mail communication and access to your chart) to send a message or to make an appointment.    Please remember to call and schedule a follow up appointment if one was recommended at your earliest convenience.     PODIATRY CLINIC HOURS  TELEPHONE NUMBER    Dr. Jason JOHNSONPPAULIE [facility]        Clinics:  Johnny Iglesias  [facility], [place], [facility]  Tuesday 1PM-6PM  [facility]  Wednesday 745AM-330PM  [place]  Thursday/Friday 745AM-230PM  [place]   1st and 3rd Mondays  845-430 PM   [facility] APPOINTMENTS  [phone]    [place] APPOINTMENTS  [phone]    [place] APPOINTMENTS  [phone]        If you need a medication refill, please contact us you may need lab work and/or a follow up visit prior to your refill (i.e. Antifungal medications).  If MRI needed please call Imaging at [phone]   HOW DO I GET MY KNEE SCOOTER? Knee scooters can be picked up at ANY Medical Supply stores with your knee scooter Prescription.  OR  Bring your signed prescription to an [facility] Medical [facility] showroom.  Call or bring in your Orthotics order to any Orthotics locations. Or call [phone]

## 2023-08-23 NOTE — LETTER
2023         RE: Isabel Perrin  [address]  [facility] [address]        Dear Colleague,    Thank you for referring your patient, Isabel Perrin, to the [facility]. Please see a copy of my visit note below.      Pt is seen today as a new pt referral with the c/c of painful left foot.  This has been symptomatic for the past 2 years.  Pt denies any hx of trauma.  Aggravated by activity and relieved by rest.  Describes as burning pain.  Is slowly getting worse.  Going up stairs is painful.  Points to posterior heel.    ROS:  see above       No Known Allergies    Current Outpatient Medications   Medication Sig Dispense Refill     acetaminophen (TYLENOL) 325 MG tablet Take 325-650 mg by mouth every 6 hours as needed for mild pain       ibuprofen (ADVIL/MOTRIN) 800 MG tablet Take 1 tablet (800 mg) by mouth every 6 hours as needed for other (mild and/or inflammatory pain) 30 tablet 0     levothyroxine (SYNTHROID/LEVOTHROID) 88 MCG tablet 88 mcg daily       meloxicam (MOBIC) 15 MG tablet Take 1 tablet (15 mg) by mouth daily 30 tablet 0     Prenatal Vit-Fe Fumarate-FA (PRENATAL MULTIVITAMIN  WITH IRON) 28-0.8 MG TABS Take 1 tablet by mouth daily 100 each 3     VITAMIN D, CHOLECALCIFEROL, PO Take 5,000 Units by mouth daily       cyclobenzaprine (FLEXERIL) 10 MG tablet Take 10 mg by mouth       letrozole (FEMARA) 2.5 MG tablet Take 2.5 mg by mouth       levothyroxine (SYNTHROID/LEVOTHROID) 75 MCG tablet Take 75 mcg by mouth daily       OVIDREL 250 MCG/0.5ML syringe SC INJ INJECT 1 DOSE AS DIRECTED         Patient Active Problem List   Diagnosis     S/P      Pregnancy     S/P repeat low transverse      Pulmonary nodule     Other primary thrombophilia (H)     Class 2 obesity     Obesity     Long term current use of anticoagulant therapy     Joint derangement of shoulder region     Irregular periods/menstrual cycles     Hypothyroidism     History of gestational diabetes  mellitus     History of being immune to rubella     Hematuria     Gastroesophageal reflux disease     Fibroids     Female infertility associated with anovulation     Elderly primigravida in first trimester     Elderly multigravida     Dysuria     Dyspnea     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)     Constipation     Breast disorder     Abnormal uterine bleeding       Past Medical History:   Diagnosis Date     Blood dyscrasia      Diabetes (H)     had gestational diabetes     Disease of thyroid gland      Female infertility      Gastroesophageal reflux disease 2021     Hypothyroid      MTHFR deficiency complicating pregnancy (H)      Obese        Past Surgical History:   Procedure Laterality Date      SECTION N/A 11/15/2018    Procedure: PRIMARY LOW TRANSVERSE  SECTION;  Surgeon: Denise Sparks MD;  Location: [facility];  Service: Obstetrics      SECTION N/A 3/11/2021    Procedure: REPEAT  SECTION;  Surgeon: Denise Sparks MD;  Location: [facility];  Service: Obstetrics     DAVINCI MYOMECTOMY  3/8/2012    Procedure:DAVINCI MYOMECTOMY; Davinci Myomectomy Converted to Open Mini-Laparotomy; Surgeon:RASHMI SERNA; Location: OR     DILATION AND CURETTAGE, OPERATIVE HYSTEROSCOPY, COMBINED N/A 2022    Procedure: HYSTEROSCOPY, WITH DILATION AND CURETTAGE, ENDOMETRIAL POLYPECTOMY;  Surgeon: Tanya Hilario DO;  Location: [facility] OR     EXCISE BREAST CYST/FIBROADENOMA/TUMOR/DUCT LESION/NIPPLE LESION/AREOLAR LESION      right fibroadenoma     LAPAROSCOPIC CYSTECTOMY OVARIAN (ONCOLOGY) Left 2021    Procedure: LAPAROSCOPIC LYSIS OF ADHESIONS;  Surgeon: Phil Palumbo MD;  Location: [facility] OR     MYOMECTOMY  ,      SHOULDER SURGERY      left shoulder     SHOULDER SURGERY Left        Family History   Problem Relation Age of Onset     Hypertension Mother      Breast Cancer No family hx of      Cancer -  "colorectal No family hx of      Diabetes No family hx of      C.A.D. No family hx of      Gynecology No family hx of         no history of fibroids       Social History     Tobacco Use     Smoking status: Never     Smokeless tobacco: Never   Substance Use Topics     Alcohol use: No         Exam:    Vitals: /85   Pulse 84   Ht 1.626 m (5' 4\")   BMI 38.28 kg/m    BMI: Body mass index is 38.28 kg/m .  Height: 5' 4\"    Constitutional/ general:  Pt is in no apparent distress, appears well-nourished.  Cooperative with history and physical exam.     Psych:  The patient answered questions appropriately.  Normal affect.  Seems to have reasonable expectations, in terms of treatment.     Lungs:  Non labored breathing, non labored speech. No cough.  No audible wheezing. Even, quiet breathing.       Vascular:  positive pedal pulses bilaterally for both the DP and PT arteries.  CFT < 3 sec.  negative ankle edema.  positive pedal hair growth.    Neuro:  Alert and oriented x 3. Coordinated gait.  Light touch sensation is intact     Derm: Normal texture and turgor.  No erythema, ecchymosis, or cyanosis.      Musculoskeletal:    Patient is ambulatory without an assistive device or brace.   Normal arch with weightbearing bilateral.  No forefoot or rear foot deformities noted.  MS 5/5 all compartments.  Normal ROM all fore foot and rearfoot joints with no pain or crepitus.  No equinus.  Pain on posterior  calcaneus left foot mostly medial and posterior.  Slight lateral Achilles insertion pain.  Negative bursal pain.  Slight edema.  No masses.  No ecchymosis.       A:  Insertional Achilles Tendonitis left foot     Plan:   Discussed etiology and treatment options in detail w/ the pt. patient is barefoot around the house.  She is wearing a malleable slip on shoe today.  Patient to wear good shoes at all times both inside and outside and I made suggestions.  Ice twice daily for 20 minutes.  She will massage this..  Discussed " physical therapy.  She will call if she would like to do this.  Prescription written for meloxicam.  She will stop if any GI distress.  Avoid activities that aggravated this.  Return to clinic in 4 weeks if not better otherwise as needed.    Jason Soriano DPM, FACFAS           Again, thank you for allowing me to participate in the care of your patient.        Sincerely,        Jason Soriano DPM

## 2023-09-26 ENCOUNTER — TRANSFERRED RECORDS (OUTPATIENT)
Dept: HEALTH INFORMATION MANAGEMENT | Facility: CLINIC | Age: 43
End: 2023-09-26
Payer: COMMERCIAL

## 2023-11-25 ENCOUNTER — HEALTH MAINTENANCE LETTER (OUTPATIENT)
Age: 43
End: 2023-11-25

## 2024-06-22 ENCOUNTER — HEALTH MAINTENANCE LETTER (OUTPATIENT)
Age: 44
End: 2024-06-22

## 2025-07-02 NOTE — H&P
I have reviewed the surgical (or preoperative) H&P that is linked to this encounter, and examined the patient. There are no significant changes.    Phil Palumbo MD     Please see message below and advise

## 2025-07-12 ENCOUNTER — HEALTH MAINTENANCE LETTER (OUTPATIENT)
Age: 45
End: 2025-07-12